# Patient Record
Sex: FEMALE | Race: WHITE
[De-identification: names, ages, dates, MRNs, and addresses within clinical notes are randomized per-mention and may not be internally consistent; named-entity substitution may affect disease eponyms.]

---

## 2020-01-17 ENCOUNTER — HOSPITAL ENCOUNTER (OUTPATIENT)
Age: 45
End: 2020-01-17
Payer: COMMERCIAL

## 2020-01-17 DIAGNOSIS — E66.9: ICD-10-CM

## 2020-01-17 DIAGNOSIS — E55.9: ICD-10-CM

## 2020-01-17 DIAGNOSIS — R30.0: ICD-10-CM

## 2020-01-17 DIAGNOSIS — R10.9: Primary | ICD-10-CM

## 2020-01-17 LAB
ALBUMIN LEVEL: 3.8 GM/DL (ref 3.4–5)
ALBUMIN/GLOB SERPL: 1.1 {RATIO} (ref 1.1–1.8)
ALP ISO SERPL-ACNC: 92 U/L (ref 46–116)
ALT SERPLBLD-CCNC: 24 U/L (ref 12–78)
AMYLASE SERPL-CCNC: 42 U/L (ref 25–115)
ANION GAP SERPL CALC-SCNC: 16 MEQ/L (ref 5–15)
AST SERPL QL: 11 U/L (ref 15–37)
BILIRUBIN,TOTAL: 0.2 MG/DL (ref 0.2–1)
BUN SERPL-MCNC: 17 MG/DL (ref 7–18)
CALCIUM SPEC-MCNC: 8.9 MG/DL (ref 8.5–10.1)
CHLORIDE SPEC-SCNC: 104 MMOL/L (ref 98–107)
CHOLEST SPEC-SCNC: 148 MG/DL (ref 140–200)
CO2 SERPL-SCNC: 24 MMOL/L (ref 21–32)
CREAT BLD-SCNC: 0.88 MG/DL (ref 0.55–1.02)
ESTIMATED GLOMERULAR FILT RATE: 70 ML/MIN (ref 60–?)
GFR (AFRICAN AMERICAN): 85 ML/MIN (ref 60–?)
GLOBULIN SER CALC-MCNC: 3.5 GM/DL (ref 1.3–3.2)
GLUCOSE: 106 MG/DL (ref 74–106)
HCT VFR BLD CALC: 43.6 % (ref 37–47)
HDLC SERPL-MCNC: 55 MG/DL (ref 29–89)
HGB BLD-MCNC: 13.7 G/DL (ref 12.2–16.2)
LIPASE: 124 U/L (ref 73–393)
MCHC RBC-ENTMCNC: 31.4 G/DL (ref 31.8–35.4)
MCV RBC: 86.4 FL (ref 81–99)
MEAN CORPUSCULAR HEMOGLOBIN: 27.1 PG (ref 27–31.2)
PLATELET # BLD: 369 K/MM3 (ref 142–424)
POTASSIUM: 4 MMOL/L (ref 3.5–5.1)
PROT SERPL-MCNC: 7.3 GM/DL (ref 6.4–8.2)
RBC # BLD AUTO: 5.04 M/MM3 (ref 4.2–5.4)
SODIUM SPEC-SCNC: 140 MMOL/L (ref 136–145)
T4 (THYROXINE): 8.9 UG/DL (ref 4.7–13.3)
TRIGLYCERIDES: 93 MG/DL (ref 30–200)
TSH SERPL-ACNC: 1.2 UIU/ML (ref 0.36–3.74)
WBC # BLD AUTO: 7.2 K/MM3 (ref 4.8–10.8)

## 2020-01-17 PROCEDURE — 83690 ASSAY OF LIPASE: CPT

## 2020-01-17 PROCEDURE — 82652 VIT D 1 25-DIHYDROXY: CPT

## 2020-01-17 PROCEDURE — 87186 SC STD MICRODIL/AGAR DIL: CPT

## 2020-01-17 PROCEDURE — 84436 ASSAY OF TOTAL THYROXINE: CPT

## 2020-01-17 PROCEDURE — 84443 ASSAY THYROID STIM HORMONE: CPT

## 2020-01-17 PROCEDURE — 80061 LIPID PANEL: CPT

## 2020-01-17 PROCEDURE — 87086 URINE CULTURE/COLONY COUNT: CPT

## 2020-01-17 PROCEDURE — 80053 COMPREHEN METABOLIC PANEL: CPT

## 2020-01-17 PROCEDURE — 85025 COMPLETE CBC W/AUTO DIFF WBC: CPT

## 2020-01-17 PROCEDURE — 82150 ASSAY OF AMYLASE: CPT

## 2020-01-17 PROCEDURE — 87088 URINE BACTERIA CULTURE: CPT

## 2020-01-27 ENCOUNTER — HOSPITAL ENCOUNTER (OUTPATIENT)
Age: 45
End: 2020-01-27
Payer: COMMERCIAL

## 2020-01-27 DIAGNOSIS — R10.9: Primary | ICD-10-CM

## 2020-01-27 PROCEDURE — 76705 ECHO EXAM OF ABDOMEN: CPT

## 2020-02-05 ENCOUNTER — HOSPITAL ENCOUNTER (OUTPATIENT)
Age: 45
End: 2020-02-05
Payer: COMMERCIAL

## 2020-02-05 DIAGNOSIS — Z12.39: Primary | ICD-10-CM

## 2020-02-05 PROCEDURE — 77067 SCR MAMMO BI INCL CAD: CPT

## 2020-02-05 PROCEDURE — 77063 BREAST TOMOSYNTHESIS BI: CPT

## 2020-03-06 ENCOUNTER — HOSPITAL ENCOUNTER (OUTPATIENT)
Age: 45
End: 2020-03-06
Payer: COMMERCIAL

## 2020-03-06 DIAGNOSIS — K76.89: Primary | ICD-10-CM

## 2020-03-06 PROCEDURE — 74170 CT ABD WO CNTRST FLWD CNTRST: CPT

## 2020-06-17 ENCOUNTER — HOSPITAL ENCOUNTER (OUTPATIENT)
Age: 45
End: 2020-06-17
Payer: SELF-PAY

## 2020-06-17 DIAGNOSIS — E66.9: Primary | ICD-10-CM

## 2020-06-17 PROCEDURE — 80305 DRUG TEST PRSMV DIR OPT OBS: CPT

## 2020-10-29 ENCOUNTER — TELEHEALTH PROVIDED OTHER THAN IN PATIENT'S HOME (OUTPATIENT)
Dept: URBAN - METROPOLITAN AREA TELEHEALTH 1 | Facility: TELEHEALTH | Age: 45
End: 2020-10-29

## 2020-10-29 DIAGNOSIS — R19.7 DIARRHEA, UNSPECIFIED: ICD-10-CM

## 2020-10-29 DIAGNOSIS — R93.2 ABNORMAL FINDINGS ON DIAGNOSTIC IMAGING OF LIVER AND BILIARY: ICD-10-CM

## 2020-10-29 DIAGNOSIS — R12 HEARTBURN: ICD-10-CM

## 2020-10-29 DIAGNOSIS — R14.0 ABDOMINAL DISTENSION (GASEOUS): ICD-10-CM

## 2020-10-29 DIAGNOSIS — R11.2 NAUSEA WITH VOMITING, UNSPECIFIED: ICD-10-CM

## 2020-10-29 DIAGNOSIS — R53.83 OTHER FATIGUE: ICD-10-CM

## 2020-10-29 DIAGNOSIS — R10.12 LEFT UPPER QUADRANT PAIN: ICD-10-CM

## 2020-10-29 PROCEDURE — 99203 OFFICE O/P NEW LOW 30 MIN: CPT | Performed by: INTERNAL MEDICINE

## 2020-10-29 RX ORDER — DICYCLOMINE HYDROCHLORIDE 10 MG/1
CAPSULE ORAL
Qty: 180 | Refills: 1 | Status: ACTIVE
Start: 2020-10-29

## 2020-10-29 RX ORDER — BUSPIRONE HYDROCHLORIDE 10 MG/1
TABLET ORAL
Qty: 60 | Refills: 11 | Status: COMPLETED
Start: 2020-10-29 | End: 2021-03-08

## 2020-11-24 ENCOUNTER — HOSPITAL ENCOUNTER (OUTPATIENT)
Age: 45
End: 2020-11-24
Payer: MEDICAID

## 2020-11-24 DIAGNOSIS — R10.12: Primary | ICD-10-CM

## 2020-11-24 DIAGNOSIS — R53.83: ICD-10-CM

## 2020-11-24 DIAGNOSIS — R12: ICD-10-CM

## 2020-11-24 DIAGNOSIS — R11.2: ICD-10-CM

## 2020-11-24 DIAGNOSIS — R14.0: ICD-10-CM

## 2020-11-24 DIAGNOSIS — R93.2: ICD-10-CM

## 2020-11-24 LAB
CRP SERPL HS-MCNC: 5.5 MG/L (ref 0–4)
FERRITIN SERPL-MCNC: 7.52 NG/ML (ref 6.24–137)
HCT VFR BLD CALC: 41.2 % (ref 37–47)
HGB BLD-MCNC: 12.3 G/DL (ref 12.2–16.2)
IRON SERPL QL: 57 UG/DL (ref 37–170)
MCHC RBC-ENTMCNC: 29.9 G/DL (ref 31.8–35.4)
MCV RBC: 84.8 FL (ref 81–99)
MEAN CORPUSCULAR HEMOGLOBIN: 25.3 PG (ref 27–31.2)
PLATELET # BLD: 346 K/MM3 (ref 142–424)
RBC # BLD AUTO: 4.86 M/MM3 (ref 4.2–5.4)
TOTAL IRON BINDING CAPACITY: 439 UG/DL (ref 265–497)
VITAMIN B12: 469 PG/ML (ref 239–931)
WBC # BLD AUTO: 4.6 K/MM3 (ref 4.8–10.8)

## 2020-11-24 PROCEDURE — 86671 FUNGUS NES ANTIBODY: CPT

## 2020-11-24 PROCEDURE — 36415 COLL VENOUS BLD VENIPUNCTURE: CPT

## 2020-11-24 PROCEDURE — 86256 FLUORESCENT ANTIBODY TITER: CPT

## 2020-11-24 PROCEDURE — 82607 VITAMIN B-12: CPT

## 2020-11-24 PROCEDURE — 83540 ASSAY OF IRON: CPT

## 2020-11-24 PROCEDURE — 83550 IRON BINDING TEST: CPT

## 2020-11-24 PROCEDURE — 86140 C-REACTIVE PROTEIN: CPT

## 2020-11-24 PROCEDURE — 83516 IMMUNOASSAY NONANTIBODY: CPT

## 2020-11-24 PROCEDURE — 85025 COMPLETE CBC W/AUTO DIFF WBC: CPT

## 2020-11-24 PROCEDURE — 85651 RBC SED RATE NONAUTOMATED: CPT

## 2020-11-24 PROCEDURE — 86255 FLUORESCENT ANTIBODY SCREEN: CPT

## 2020-11-24 PROCEDURE — 82728 ASSAY OF FERRITIN: CPT

## 2020-11-25 LAB
DEAMIDATED GLIADIN ABS, IGA: 6 UNITS (ref 0–19)
DEAMIDATED GLIADIN ABS, IGG: 3 UNITS (ref 0–19)

## 2020-11-27 LAB
SACCHAROMYCES CEREVISIAE, IGA: <20 UNITS (ref 0–24.9)
SACCHAROMYCES CEREVISIAE, IGG: <20 UNITS (ref 0–24.9)

## 2020-11-28 ENCOUNTER — HOSPITAL ENCOUNTER (OUTPATIENT)
Age: 45
End: 2020-11-28
Payer: MEDICAID

## 2020-11-28 DIAGNOSIS — Z01.812: Primary | ICD-10-CM

## 2020-11-28 PROCEDURE — 36415 COLL VENOUS BLD VENIPUNCTURE: CPT

## 2020-11-28 PROCEDURE — 86328 IA NFCT AB SARSCOV2 COVID19: CPT

## 2020-11-30 ENCOUNTER — HOSPITAL ENCOUNTER (OUTPATIENT)
Dept: HOSPITAL 22 - OUTP | Age: 45
Discharge: HOME | End: 2020-11-30
Payer: MEDICAID

## 2020-11-30 ENCOUNTER — ON CAMPUS - OUTPATIENT (OUTPATIENT)
Dept: RURAL HOSPITAL 6 | Facility: HOSPITAL | Age: 45
End: 2020-11-30

## 2020-11-30 VITALS
RESPIRATION RATE: 16 BRPM | DIASTOLIC BLOOD PRESSURE: 73 MMHG | OXYGEN SATURATION: 99 % | SYSTOLIC BLOOD PRESSURE: 107 MMHG | HEART RATE: 81 BPM

## 2020-11-30 VITALS
OXYGEN SATURATION: 92 % | SYSTOLIC BLOOD PRESSURE: 88 MMHG | DIASTOLIC BLOOD PRESSURE: 63 MMHG | RESPIRATION RATE: 16 BRPM | TEMPERATURE: 97.4 F | HEART RATE: 89 BPM

## 2020-11-30 VITALS
OXYGEN SATURATION: 97 % | SYSTOLIC BLOOD PRESSURE: 116 MMHG | TEMPERATURE: 96.98 F | RESPIRATION RATE: 18 BRPM | HEART RATE: 100 BPM | DIASTOLIC BLOOD PRESSURE: 77 MMHG

## 2020-11-30 VITALS
HEART RATE: 83 BPM | OXYGEN SATURATION: 99 % | DIASTOLIC BLOOD PRESSURE: 78 MMHG | SYSTOLIC BLOOD PRESSURE: 109 MMHG | RESPIRATION RATE: 16 BRPM

## 2020-11-30 VITALS
SYSTOLIC BLOOD PRESSURE: 104 MMHG | OXYGEN SATURATION: 98 % | DIASTOLIC BLOOD PRESSURE: 72 MMHG | RESPIRATION RATE: 16 BRPM | HEART RATE: 92 BPM

## 2020-11-30 VITALS
HEART RATE: 77 BPM | OXYGEN SATURATION: 100 % | RESPIRATION RATE: 16 BRPM | DIASTOLIC BLOOD PRESSURE: 78 MMHG | SYSTOLIC BLOOD PRESSURE: 111 MMHG

## 2020-11-30 VITALS — OXYGEN SATURATION: 100 %

## 2020-11-30 VITALS — BODY MASS INDEX: 37.2 KG/M2

## 2020-11-30 DIAGNOSIS — R11.2 NAUSEA WITH VOMITING, UNSPECIFIED: ICD-10-CM

## 2020-11-30 DIAGNOSIS — R12 HEARTBURN: ICD-10-CM

## 2020-11-30 DIAGNOSIS — K29.50 UNSPECIFIED CHRONIC GASTRITIS WITHOUT BLEEDING: ICD-10-CM

## 2020-11-30 DIAGNOSIS — K64.0: ICD-10-CM

## 2020-11-30 DIAGNOSIS — I85.00 ESOPHAGEAL VARICES WITHOUT BLEEDING: ICD-10-CM

## 2020-11-30 DIAGNOSIS — K57.90 DIVERTICULOSIS OF INTESTINE, PART UNSPECIFIED, WITHOUT PERFO: ICD-10-CM

## 2020-11-30 DIAGNOSIS — K44.9: ICD-10-CM

## 2020-11-30 DIAGNOSIS — K21.00: ICD-10-CM

## 2020-11-30 DIAGNOSIS — K30 FUNCTIONAL DYSPEPSIA: ICD-10-CM

## 2020-11-30 DIAGNOSIS — D12.5 BENIGN NEOPLASM OF SIGMOID COLON: ICD-10-CM

## 2020-11-30 DIAGNOSIS — I85.00: ICD-10-CM

## 2020-11-30 DIAGNOSIS — K52.9 NONINFECTIVE GASTROENTERITIS AND COLITIS, UNSPECIFIED: ICD-10-CM

## 2020-11-30 DIAGNOSIS — D12.4 BENIGN NEOPLASM OF DESCENDING COLON: ICD-10-CM

## 2020-11-30 DIAGNOSIS — K63.5: Primary | ICD-10-CM

## 2020-11-30 DIAGNOSIS — K31.89: ICD-10-CM

## 2020-11-30 DIAGNOSIS — K21.00 GASTRO-ESOPHAGEAL REFLUX DISEASE WITH ESOPHAGITIS, WITHOUT B: ICD-10-CM

## 2020-11-30 DIAGNOSIS — R10.12 LEFT UPPER QUADRANT PAIN: ICD-10-CM

## 2020-11-30 DIAGNOSIS — K57.30: ICD-10-CM

## 2020-11-30 LAB
AMMONIA: < 9 UMOL/L (ref 9–30)
AMYLASE SERPL-CCNC: 38 U/L (ref 30–110)
CRP SERPL HS-MCNC: 4.5 MG/L (ref 0–4)
FERRITIN SERPL-MCNC: 9.15 NG/ML (ref 6.24–137)
INR PPP: 1.05 (ref 0.9–1.1)
IRON SERPL QL: 55 UG/DL (ref 37–170)
LIPASE: 68 U/L (ref 23–300)
PT BLD: 11.6 SECONDS (ref 9.4–11.8)
TOTAL IRON BINDING CAPACITY: 426 UG/DL (ref 265–497)

## 2020-11-30 PROCEDURE — 83690 ASSAY OF LIPASE: CPT

## 2020-11-30 PROCEDURE — 82103 ALPHA-1-ANTITRYPSIN TOTAL: CPT

## 2020-11-30 PROCEDURE — 0DJ08ZZ INSPECTION OF UPPER INTESTINAL TRACT, VIA NATURAL OR ARTIFICIAL OPENING ENDOSCOPIC: ICD-10-PCS | Performed by: INTERNAL MEDICINE

## 2020-11-30 PROCEDURE — 82784 ASSAY IGA/IGD/IGG/IGM EACH: CPT

## 2020-11-30 PROCEDURE — 81025 URINE PREGNANCY TEST: CPT

## 2020-11-30 PROCEDURE — 82104 ALPHA-1-ANTITRYPSIN PHENO: CPT

## 2020-11-30 PROCEDURE — 43239 EGD BIOPSY SINGLE/MULTIPLE: CPT

## 2020-11-30 PROCEDURE — 86255 FLUORESCENT ANTIBODY SCREEN: CPT

## 2020-11-30 PROCEDURE — 86235 NUCLEAR ANTIGEN ANTIBODY: CPT

## 2020-11-30 PROCEDURE — 86140 C-REACTIVE PROTEIN: CPT

## 2020-11-30 PROCEDURE — 82105 ALPHA-FETOPROTEIN SERUM: CPT

## 2020-11-30 PROCEDURE — 82150 ASSAY OF AMYLASE: CPT

## 2020-11-30 PROCEDURE — 45385 COLONOSCOPY W/LESION REMOVAL: CPT | Performed by: INTERNAL MEDICINE

## 2020-11-30 PROCEDURE — 83540 ASSAY OF IRON: CPT

## 2020-11-30 PROCEDURE — 45385 COLONOSCOPY W/LESION REMOVAL: CPT

## 2020-11-30 PROCEDURE — 82140 ASSAY OF AMMONIA: CPT

## 2020-11-30 PROCEDURE — 83550 IRON BINDING TEST: CPT

## 2020-11-30 PROCEDURE — 43239 EGD BIOPSY SINGLE/MULTIPLE: CPT | Performed by: INTERNAL MEDICINE

## 2020-11-30 PROCEDURE — 36415 COLL VENOUS BLD VENIPUNCTURE: CPT

## 2020-11-30 PROCEDURE — 82164 ANGIOTENSIN I ENZYME TEST: CPT

## 2020-11-30 PROCEDURE — 85610 PROTHROMBIN TIME: CPT

## 2020-11-30 PROCEDURE — 86225 DNA ANTIBODY NATIVE: CPT

## 2020-11-30 PROCEDURE — 82728 ASSAY OF FERRITIN: CPT

## 2020-11-30 PROCEDURE — 86376 MICROSOMAL ANTIBODY EACH: CPT

## 2020-12-01 LAB
IGA SERPL-MCNC: 211 MG/DL (ref 87–352)
IGG SERPL-MCNC: 875 MG/DL (ref 586–1602)
IGM SERPL-MCNC: 292 MG/DL (ref 26–217)

## 2020-12-07 LAB
A2 MACROGLOB SERPL-MCNC: 213 MG/DL (ref 110–276)
ALT SERPL W P-5'-P-CCNC: 30 IU/L (ref 0–40)
APO A-I SERPL-MCNC: 171 MG/DL (ref 116–209)
AST SERPL W P-5'-P-CCNC: 22 IU/L (ref 0–40)
BILIRUB SERPL-MCNC: 0.3 MG/DL (ref 0–1.2)
CHOLEST SERPL-MCNC: 137 MG/DL (ref 100–199)
GGT SERPL-CCNC: 18 IU/L (ref 0–60)
GLUCOSE SERPL-MCNC: 82 MG/DL (ref 65–99)
HAPTOGLOB SERPL-MCNC: 210 MG/DL (ref 42–296)
LIVER FIBR SCORE SERPL CALC.FIBROSURE: (no result)
LIVER STEATOSIS GRADE SERPL QL: (no result)
LIVER STEATOSIS SCORE SERPL: (no result)
NASH GRADE SERPL QL: (no result)
NASH SCORE SERPL: (no result)
TRIGL SERPL-MCNC: 53 MG/DL (ref 0–149)

## 2021-03-08 ENCOUNTER — OFFICE (OUTPATIENT)
Dept: RURAL CLINIC 2 | Facility: CLINIC | Age: 46
End: 2021-03-08

## 2021-03-08 VITALS
TEMPERATURE: 96.9 F | DIASTOLIC BLOOD PRESSURE: 72 MMHG | WEIGHT: 190 LBS | HEIGHT: 61 IN | SYSTOLIC BLOOD PRESSURE: 112 MMHG

## 2021-03-08 DIAGNOSIS — R14.0 ABDOMINAL DISTENSION (GASEOUS): ICD-10-CM

## 2021-03-08 DIAGNOSIS — R53.83 OTHER FATIGUE: ICD-10-CM

## 2021-03-08 DIAGNOSIS — R10.12 LEFT UPPER QUADRANT PAIN: ICD-10-CM

## 2021-03-08 DIAGNOSIS — R93.2 ABNORMAL FINDINGS ON DIAGNOSTIC IMAGING OF LIVER AND BILIARY: ICD-10-CM

## 2021-03-08 DIAGNOSIS — E61.1 IRON DEFICIENCY: ICD-10-CM

## 2021-03-08 PROCEDURE — 99214 OFFICE O/P EST MOD 30 MIN: CPT | Performed by: NURSE PRACTITIONER

## 2021-03-08 RX ORDER — OMEPRAZOLE 20 MG/1
20 CAPSULE, DELAYED RELEASE ORAL
Qty: 90 | Refills: 3 | Status: ACTIVE

## 2021-03-11 ENCOUNTER — HOSPITAL ENCOUNTER (OUTPATIENT)
Age: 46
End: 2021-03-11
Payer: COMMERCIAL

## 2021-03-11 DIAGNOSIS — R14.0: ICD-10-CM

## 2021-03-11 DIAGNOSIS — R10.12: Primary | ICD-10-CM

## 2021-03-11 DIAGNOSIS — R93.2: ICD-10-CM

## 2021-03-11 DIAGNOSIS — R53.83: ICD-10-CM

## 2021-03-11 LAB
ALBUMIN LEVEL: 3.7 G/DL (ref 3.5–5)
ALBUMIN/GLOB SERPL: 1.2 {RATIO} (ref 1.1–1.8)
ALP ISO SERPL-ACNC: 89 U/L (ref 38–126)
ALT SERPLBLD-CCNC: 19 U/L (ref 12–78)
ANION GAP SERPL CALC-SCNC: 9.3 MEQ/L (ref 5–15)
AST SERPL QL: 21 U/L (ref 14–36)
BILIRUBIN,TOTAL: 0.5 MG/DL (ref 0.2–1.3)
BUN SERPL-MCNC: 12 MG/DL (ref 7–17)
CALCIUM SPEC-MCNC: 9 MG/DL (ref 8.4–10.2)
CHLORIDE SPEC-SCNC: 109 MMOL/L (ref 98–107)
CO2 SERPL-SCNC: 24 MMOL/L (ref 22–30)
CREAT BLD-SCNC: 0.6 MG/DL (ref 0.52–1.04)
ESTIMATED GLOMERULAR FILT RATE: 109 ML/MIN (ref 60–?)
FERRITIN SERPL-MCNC: 7.94 NG/ML (ref 6.24–137)
GFR (AFRICAN AMERICAN): 131 ML/MIN (ref 60–?)
GLOBULIN SER CALC-MCNC: 3 G/DL (ref 1.3–3.2)
GLUCOSE: 93 MG/DL (ref 74–100)
HCT VFR BLD CALC: 41 % (ref 37–47)
HGB BLD-MCNC: 12.2 G/DL (ref 12.2–16.2)
IRON SERPL QL: 51 UG/DL (ref 37–170)
MCHC RBC-ENTMCNC: 29.9 G/DL (ref 31.8–35.4)
MCV RBC: 86.1 FL (ref 81–99)
MEAN CORPUSCULAR HEMOGLOBIN: 25.7 PG (ref 27–31.2)
PLATELET # BLD: 352 K/MM3 (ref 142–424)
POTASSIUM: 4.3 MMOL/L (ref 3.5–5.1)
PROT SERPL-MCNC: 6.7 G/DL (ref 6.3–8.2)
RBC # BLD AUTO: 4.76 M/MM3 (ref 4.2–5.4)
SODIUM SPEC-SCNC: 138 MMOL/L (ref 136–145)
TOTAL IRON BINDING CAPACITY: 404 UG/DL (ref 265–497)
WBC # BLD AUTO: 5 K/MM3 (ref 4.8–10.8)

## 2021-03-11 PROCEDURE — 36415 COLL VENOUS BLD VENIPUNCTURE: CPT

## 2021-03-11 PROCEDURE — 82728 ASSAY OF FERRITIN: CPT

## 2021-03-11 PROCEDURE — 80053 COMPREHEN METABOLIC PANEL: CPT

## 2021-03-11 PROCEDURE — 85025 COMPLETE CBC W/AUTO DIFF WBC: CPT

## 2021-03-11 PROCEDURE — 83540 ASSAY OF IRON: CPT

## 2021-03-11 PROCEDURE — 83550 IRON BINDING TEST: CPT

## 2021-03-15 ENCOUNTER — HOSPITAL ENCOUNTER (OUTPATIENT)
Age: 46
End: 2021-03-15
Payer: COMMERCIAL

## 2021-03-15 DIAGNOSIS — R14.0: ICD-10-CM

## 2021-03-15 DIAGNOSIS — R93.2: ICD-10-CM

## 2021-03-15 DIAGNOSIS — R53.83: ICD-10-CM

## 2021-03-15 DIAGNOSIS — R10.12: Primary | ICD-10-CM

## 2021-03-15 PROCEDURE — A9576 INJ PROHANCE MULTIPACK: HCPCS

## 2021-03-15 PROCEDURE — 74183 MRI ABD W/O CNTR FLWD CNTR: CPT

## 2021-05-14 ENCOUNTER — HOSPITAL ENCOUNTER (EMERGENCY)
Age: 46
Discharge: HOME | End: 2021-05-14
Payer: COMMERCIAL

## 2021-05-14 VITALS
RESPIRATION RATE: 20 BRPM | OXYGEN SATURATION: 96 % | DIASTOLIC BLOOD PRESSURE: 80 MMHG | TEMPERATURE: 98.5 F | SYSTOLIC BLOOD PRESSURE: 108 MMHG | HEART RATE: 111 BPM

## 2021-05-14 VITALS
SYSTOLIC BLOOD PRESSURE: 108 MMHG | TEMPERATURE: 98.42 F | DIASTOLIC BLOOD PRESSURE: 80 MMHG | HEART RATE: 111 BPM | RESPIRATION RATE: 20 BRPM | OXYGEN SATURATION: 96 %

## 2021-05-14 VITALS — BODY MASS INDEX: 38 KG/M2

## 2021-05-14 DIAGNOSIS — J06.9: Primary | ICD-10-CM

## 2021-05-14 DIAGNOSIS — Z20.822: ICD-10-CM

## 2021-05-14 DIAGNOSIS — K21.9: ICD-10-CM

## 2021-05-14 PROCEDURE — U0003 INFECTIOUS AGENT DETECTION BY NUCLEIC ACID (DNA OR RNA); SEVERE ACUTE RESPIRATORY SYNDROME CORONAVIRUS 2 (SARS-COV-2) (CORONAVIRUS DISEASE [COVID-19]), AMPLIFIED PROBE TECHNIQUE, MAKING USE OF HIGH THROUGHPUT TECHNOLOGIES AS DESCRIBED BY CMS-2020-01-R: HCPCS

## 2021-05-14 PROCEDURE — 99202 OFFICE O/P NEW SF 15 MIN: CPT

## 2021-05-14 PROCEDURE — 87880 STREP A ASSAY W/OPTIC: CPT

## 2021-05-14 PROCEDURE — G0463 HOSPITAL OUTPT CLINIC VISIT: HCPCS

## 2021-09-07 ENCOUNTER — HOSPITAL ENCOUNTER (OUTPATIENT)
Age: 46
End: 2021-09-07
Payer: COMMERCIAL

## 2021-09-07 DIAGNOSIS — R53.83: ICD-10-CM

## 2021-09-07 DIAGNOSIS — R14.0: ICD-10-CM

## 2021-09-07 DIAGNOSIS — E61.1: ICD-10-CM

## 2021-09-07 DIAGNOSIS — K76.9: Primary | ICD-10-CM

## 2021-09-07 DIAGNOSIS — R10.12: ICD-10-CM

## 2021-09-07 DIAGNOSIS — R93.2: ICD-10-CM

## 2021-09-07 LAB
ALBUMIN LEVEL: 3.9 G/DL (ref 3.5–5)
ALBUMIN/GLOB SERPL: 1.3 {RATIO} (ref 1.1–1.8)
ALP ISO SERPL-ACNC: 98 U/L (ref 38–126)
ALT SERPLBLD-CCNC: 24 U/L (ref 12–78)
ANION GAP SERPL CALC-SCNC: 14 MEQ/L (ref 5–15)
AST SERPL QL: 24 U/L (ref 14–36)
BILIRUBIN,TOTAL: 0.1 MG/DL (ref 0.2–1.3)
BUN SERPL-MCNC: 15 MG/DL (ref 7–17)
CALCIUM SPEC-MCNC: 9.4 MG/DL (ref 8.4–10.2)
CHLORIDE SPEC-SCNC: 106 MMOL/L (ref 98–107)
CO2 SERPL-SCNC: 24 MMOL/L (ref 22–30)
CREAT BLD-SCNC: 0.7 MG/DL (ref 0.52–1.04)
ESTIMATED GLOMERULAR FILT RATE: 90 ML/MIN (ref 60–?)
GFR (AFRICAN AMERICAN): 109 ML/MIN (ref 60–?)
GLOBULIN SER CALC-MCNC: 2.9 G/DL (ref 1.3–3.2)
GLUCOSE: 116 MG/DL (ref 74–100)
HCT VFR BLD CALC: 40.2 % (ref 37–47)
HGB BLD-MCNC: 12.6 G/DL (ref 12.2–16.2)
IRON SERPL QL: 53 UG/DL (ref 37–170)
MCHC RBC-ENTMCNC: 31.3 G/DL (ref 31.8–35.4)
MCV RBC: 83.6 FL (ref 81–99)
MEAN CORPUSCULAR HEMOGLOBIN: 26.2 PG (ref 27–31.2)
PLATELET # BLD: 366 K/MM3 (ref 142–424)
POTASSIUM: 4 MMOL/L (ref 3.5–5.1)
PROT SERPL-MCNC: 6.8 G/DL (ref 6.3–8.2)
RBC # BLD AUTO: 4.81 M/MM3 (ref 4.2–5.4)
SODIUM SPEC-SCNC: 140 MMOL/L (ref 136–145)
TOTAL IRON BINDING CAPACITY: 428 UG/DL (ref 265–497)
WBC # BLD AUTO: 6 K/MM3 (ref 4.8–10.8)

## 2021-09-07 PROCEDURE — 83540 ASSAY OF IRON: CPT

## 2021-09-07 PROCEDURE — 80053 COMPREHEN METABOLIC PANEL: CPT

## 2021-09-07 PROCEDURE — 36415 COLL VENOUS BLD VENIPUNCTURE: CPT

## 2021-09-07 PROCEDURE — 82105 ALPHA-FETOPROTEIN SERUM: CPT

## 2021-09-07 PROCEDURE — 85025 COMPLETE CBC W/AUTO DIFF WBC: CPT

## 2021-09-07 PROCEDURE — 83550 IRON BINDING TEST: CPT

## 2021-09-13 ENCOUNTER — OFFICE (OUTPATIENT)
Dept: RURAL CLINIC 2 | Facility: CLINIC | Age: 46
End: 2021-09-13

## 2021-09-13 VITALS
DIASTOLIC BLOOD PRESSURE: 79 MMHG | TEMPERATURE: 96.7 F | WEIGHT: 203 LBS | HEIGHT: 61 IN | SYSTOLIC BLOOD PRESSURE: 116 MMHG

## 2021-09-13 DIAGNOSIS — E66.9 OBESITY, UNSPECIFIED: ICD-10-CM

## 2021-09-13 DIAGNOSIS — R19.7 DIARRHEA, UNSPECIFIED: ICD-10-CM

## 2021-09-13 DIAGNOSIS — R15.2 FECAL URGENCY: ICD-10-CM

## 2021-09-13 DIAGNOSIS — K76.89 OTHER SPECIFIED DISEASES OF LIVER: ICD-10-CM

## 2021-09-13 PROCEDURE — 99214 OFFICE O/P EST MOD 30 MIN: CPT | Performed by: NURSE PRACTITIONER

## 2021-09-13 RX ORDER — OMEPRAZOLE 20 MG/1
20 CAPSULE, DELAYED RELEASE ORAL
Qty: 90 | Refills: 3 | Status: ACTIVE

## 2021-09-13 RX ORDER — COLESTIPOL HYDROCHLORIDE 1 G/1
1 TABLET, FILM COATED ORAL
Qty: 30 | Refills: 11 | Status: ACTIVE
Start: 2021-09-13

## 2021-09-13 RX ORDER — BUSPIRONE HYDROCHLORIDE 10 MG/1
20 TABLET ORAL
Qty: 180 | Refills: 4 | Status: ACTIVE
Start: 2021-09-13

## 2021-09-18 ENCOUNTER — HOSPITAL ENCOUNTER (EMERGENCY)
Age: 46
Discharge: HOME | End: 2021-09-18
Payer: COMMERCIAL

## 2021-09-18 VITALS
TEMPERATURE: 98.1 F | DIASTOLIC BLOOD PRESSURE: 88 MMHG | OXYGEN SATURATION: 99 % | SYSTOLIC BLOOD PRESSURE: 133 MMHG | RESPIRATION RATE: 16 BRPM | HEART RATE: 104 BPM

## 2021-09-18 VITALS
TEMPERATURE: 98.5 F | DIASTOLIC BLOOD PRESSURE: 88 MMHG | HEART RATE: 104 BPM | RESPIRATION RATE: 18 BRPM | SYSTOLIC BLOOD PRESSURE: 133 MMHG | OXYGEN SATURATION: 99 %

## 2021-09-18 VITALS — BODY MASS INDEX: 38 KG/M2

## 2021-09-18 DIAGNOSIS — K21.9: ICD-10-CM

## 2021-09-18 DIAGNOSIS — Z86.718: ICD-10-CM

## 2021-09-18 DIAGNOSIS — R60.0: Primary | ICD-10-CM

## 2021-09-18 PROCEDURE — 99202 OFFICE O/P NEW SF 15 MIN: CPT

## 2021-09-18 PROCEDURE — G0463 HOSPITAL OUTPT CLINIC VISIT: HCPCS

## 2021-09-19 ENCOUNTER — HOSPITAL ENCOUNTER (OUTPATIENT)
Dept: HOSPITAL 22 - RAD | Age: 46
End: 2021-09-19
Payer: COMMERCIAL

## 2021-09-19 DIAGNOSIS — M79.662: ICD-10-CM

## 2021-09-19 DIAGNOSIS — Z86.718: ICD-10-CM

## 2021-09-19 DIAGNOSIS — R60.0: Primary | ICD-10-CM

## 2021-09-19 PROCEDURE — 93970 EXTREMITY STUDY: CPT

## 2021-09-29 ENCOUNTER — HOSPITAL ENCOUNTER (OUTPATIENT)
Age: 46
End: 2021-09-29
Payer: COMMERCIAL

## 2021-09-29 DIAGNOSIS — R60.0: Primary | ICD-10-CM

## 2021-09-29 PROCEDURE — 84443 ASSAY THYROID STIM HORMONE: CPT

## 2021-09-29 PROCEDURE — 85025 COMPLETE CBC W/AUTO DIFF WBC: CPT

## 2021-09-29 PROCEDURE — 84436 ASSAY OF TOTAL THYROXINE: CPT

## 2021-09-29 PROCEDURE — 80053 COMPREHEN METABOLIC PANEL: CPT

## 2021-09-30 LAB
ALBUMIN LEVEL: 3.8 G/DL (ref 3.5–5)
ALBUMIN/GLOB SERPL: 1.3 {RATIO} (ref 1.1–1.8)
ALP ISO SERPL-ACNC: 95 U/L (ref 38–126)
ALT SERPLBLD-CCNC: 21 U/L (ref 12–78)
ANION GAP SERPL CALC-SCNC: 14.8 MEQ/L (ref 5–15)
AST SERPL QL: 23 U/L (ref 14–36)
BILIRUBIN,TOTAL: 0.3 MG/DL (ref 0.2–1.3)
BUN SERPL-MCNC: 12 MG/DL (ref 7–17)
CALCIUM SPEC-MCNC: 8.8 MG/DL (ref 8.4–10.2)
CHLORIDE SPEC-SCNC: 107 MMOL/L (ref 98–107)
CO2 SERPL-SCNC: 23 MMOL/L (ref 22–30)
CREAT BLD-SCNC: 0.7 MG/DL (ref 0.52–1.04)
ESTIMATED GLOMERULAR FILT RATE: 90 ML/MIN (ref 60–?)
GFR (AFRICAN AMERICAN): 109 ML/MIN (ref 60–?)
GLOBULIN SER CALC-MCNC: 2.9 G/DL (ref 1.3–3.2)
GLUCOSE: 51 MG/DL (ref 74–100)
HCT VFR BLD CALC: 40.8 % (ref 37–47)
HGB BLD-MCNC: 12.1 G/DL (ref 12.2–16.2)
MCHC RBC-ENTMCNC: 29.7 G/DL (ref 31.8–35.4)
MCV RBC: 86.4 FL (ref 81–99)
MEAN CORPUSCULAR HEMOGLOBIN: 25.7 PG (ref 27–31.2)
PLATELET # BLD: 368 K/MM3 (ref 142–424)
POTASSIUM: 4.8 MMOL/L (ref 3.5–5.1)
PROT SERPL-MCNC: 6.7 G/DL (ref 6.3–8.2)
RBC # BLD AUTO: 4.73 M/MM3 (ref 4.2–5.4)
SODIUM SPEC-SCNC: 140 MMOL/L (ref 136–145)
T4 (THYROXINE): 6.9 UG/DL (ref 5.53–11)
TSH SERPL-ACNC: 1.15 UIU/ML (ref 0.47–4.68)
WBC # BLD AUTO: 5.2 K/MM3 (ref 4.8–10.8)

## 2021-10-08 ENCOUNTER — HOSPITAL ENCOUNTER (OUTPATIENT)
Age: 46
End: 2021-10-08
Payer: COMMERCIAL

## 2021-10-08 DIAGNOSIS — R60.0: Primary | ICD-10-CM

## 2021-10-08 PROCEDURE — 93306 TTE W/DOPPLER COMPLETE: CPT

## 2021-11-01 ENCOUNTER — HOSPITAL ENCOUNTER (OUTPATIENT)
Age: 46
End: 2021-11-01
Payer: COMMERCIAL

## 2021-11-01 DIAGNOSIS — R40.0: ICD-10-CM

## 2021-11-01 DIAGNOSIS — R60.0: ICD-10-CM

## 2021-11-01 DIAGNOSIS — I10: ICD-10-CM

## 2021-11-01 DIAGNOSIS — R00.0: ICD-10-CM

## 2021-11-01 DIAGNOSIS — R06.83: ICD-10-CM

## 2021-11-01 DIAGNOSIS — R06.00: Primary | ICD-10-CM

## 2021-11-01 PROCEDURE — 95806 SLEEP STUDY UNATT&RESP EFFT: CPT

## 2021-11-09 ENCOUNTER — HOSPITAL ENCOUNTER (OUTPATIENT)
Age: 46
End: 2021-11-09
Payer: COMMERCIAL

## 2021-11-09 DIAGNOSIS — R60.0: ICD-10-CM

## 2021-11-09 DIAGNOSIS — R06.00: Primary | ICD-10-CM

## 2021-11-09 DIAGNOSIS — I10: ICD-10-CM

## 2021-11-09 DIAGNOSIS — R40.0: ICD-10-CM

## 2021-11-09 DIAGNOSIS — R06.83: ICD-10-CM

## 2021-11-09 DIAGNOSIS — R00.0: ICD-10-CM

## 2021-11-09 DIAGNOSIS — Z86.718: ICD-10-CM

## 2021-11-09 LAB
ANION GAP SERPL CALC-SCNC: 13.3 MEQ/L (ref 5–15)
BUN SERPL-MCNC: 15 MG/DL (ref 7–17)
CALCIUM SPEC-MCNC: 9.2 MG/DL (ref 8.4–10.2)
CHLORIDE SPEC-SCNC: 109 MMOL/L (ref 98–107)
CO2 SERPL-SCNC: 24 MMOL/L (ref 22–30)
CREAT BLD-SCNC: 0.6 MG/DL (ref 0.52–1.04)
ESTIMATED GLOMERULAR FILT RATE: 108 ML/MIN (ref 60–?)
GFR (AFRICAN AMERICAN): 131 ML/MIN (ref 60–?)
GLUCOSE: 120 MG/DL (ref 74–100)
POTASSIUM: 4.3 MMOL/L (ref 3.5–5.1)
SODIUM SPEC-SCNC: 142 MMOL/L (ref 136–145)

## 2021-11-09 PROCEDURE — 80048 BASIC METABOLIC PNL TOTAL CA: CPT

## 2021-11-09 PROCEDURE — 36415 COLL VENOUS BLD VENIPUNCTURE: CPT

## 2021-11-12 ENCOUNTER — HOSPITAL ENCOUNTER (OUTPATIENT)
Age: 46
End: 2021-11-12
Payer: COMMERCIAL

## 2021-11-12 DIAGNOSIS — I10: ICD-10-CM

## 2021-11-12 DIAGNOSIS — R07.9: Primary | ICD-10-CM

## 2021-11-12 DIAGNOSIS — Z86.718: ICD-10-CM

## 2021-11-12 PROCEDURE — 93017 CV STRESS TEST TRACING ONLY: CPT

## 2022-08-25 ENCOUNTER — HOSPITAL ENCOUNTER (EMERGENCY)
Age: 47
Discharge: HOME | End: 2022-08-25
Payer: COMMERCIAL

## 2022-08-25 VITALS
DIASTOLIC BLOOD PRESSURE: 82 MMHG | TEMPERATURE: 97.88 F | OXYGEN SATURATION: 97 % | HEART RATE: 82 BPM | SYSTOLIC BLOOD PRESSURE: 114 MMHG | RESPIRATION RATE: 20 BRPM

## 2022-08-25 VITALS
HEART RATE: 82 BPM | TEMPERATURE: 97.9 F | DIASTOLIC BLOOD PRESSURE: 82 MMHG | SYSTOLIC BLOOD PRESSURE: 114 MMHG | OXYGEN SATURATION: 97 % | RESPIRATION RATE: 20 BRPM

## 2022-08-25 VITALS — BODY MASS INDEX: 38.7 KG/M2

## 2022-08-25 DIAGNOSIS — N39.0: Primary | ICD-10-CM

## 2022-08-25 LAB
PH,URINE: 7 (ref 5–8.5)
SPECIFIC GRAVITY, URINE: 1.02 (ref 1–1.03)
UROBILINOGEN,URINE: 0.2 EU/DL

## 2022-08-25 PROCEDURE — G0463 HOSPITAL OUTPT CLINIC VISIT: HCPCS

## 2022-08-25 PROCEDURE — 99212 OFFICE O/P EST SF 10 MIN: CPT

## 2022-08-25 PROCEDURE — 81003 URINALYSIS AUTO W/O SCOPE: CPT

## 2022-08-31 ENCOUNTER — OFFICE (OUTPATIENT)
Dept: URBAN - METROPOLITAN AREA CLINIC 4 | Facility: CLINIC | Age: 47
End: 2022-08-31

## 2022-08-31 VITALS — WEIGHT: 207 LBS | SYSTOLIC BLOOD PRESSURE: 112 MMHG | HEIGHT: 61 IN | DIASTOLIC BLOOD PRESSURE: 86 MMHG

## 2022-08-31 DIAGNOSIS — R15.2 FECAL URGENCY: ICD-10-CM

## 2022-08-31 DIAGNOSIS — R14.0 ABDOMINAL DISTENSION (GASEOUS): ICD-10-CM

## 2022-08-31 DIAGNOSIS — K30 FUNCTIONAL DYSPEPSIA: ICD-10-CM

## 2022-08-31 DIAGNOSIS — R19.7 DIARRHEA, UNSPECIFIED: ICD-10-CM

## 2022-08-31 DIAGNOSIS — E66.9 OBESITY, UNSPECIFIED: ICD-10-CM

## 2022-08-31 PROCEDURE — 99214 OFFICE O/P EST MOD 30 MIN: CPT | Performed by: NURSE PRACTITIONER

## 2022-08-31 RX ORDER — OMEPRAZOLE 20 MG/1
20 CAPSULE, DELAYED RELEASE ORAL
Qty: 90 | Refills: 1 | Status: ACTIVE
Start: 2022-08-31

## 2022-08-31 RX ORDER — DICYCLOMINE HYDROCHLORIDE 10 MG/1
CAPSULE ORAL
Qty: 180 | Refills: 1 | Status: ACTIVE
Start: 2020-10-29

## 2022-08-31 RX ORDER — BUSPIRONE HYDROCHLORIDE 10 MG/1
TABLET ORAL
Qty: 90 | Refills: 3 | Status: ACTIVE
Start: 2022-08-31

## 2022-10-25 ENCOUNTER — HOSPITAL ENCOUNTER (OUTPATIENT)
Age: 47
End: 2022-10-25
Payer: COMMERCIAL

## 2022-10-25 DIAGNOSIS — R53.83: ICD-10-CM

## 2022-10-25 DIAGNOSIS — R35.0: ICD-10-CM

## 2022-10-25 DIAGNOSIS — M54.50: Primary | ICD-10-CM

## 2022-10-25 DIAGNOSIS — E55.9: ICD-10-CM

## 2022-10-25 LAB
25-OH VITAMIN D, TOTAL: 18.5 NG/ML (ref 30–100)
ALBUMIN LEVEL: 4 G/DL (ref 3.5–5)
ALBUMIN/GLOB SERPL: 1.3 {RATIO} (ref 1.1–1.8)
ALP ISO SERPL-ACNC: 134 U/L (ref 38–126)
ALT SERPLBLD-CCNC: 32 U/L (ref 12–78)
ANION GAP SERPL CALC-SCNC: 11.6 MEQ/L (ref 5–15)
AST SERPL QL: 31 U/L (ref 14–36)
BILIRUBIN,TOTAL: 0.5 MG/DL (ref 0.2–1.3)
BUN SERPL-MCNC: 12 MG/DL (ref 7–17)
CALCIUM SPEC-MCNC: 8.7 MG/DL (ref 8.4–10.2)
CHLORIDE SPEC-SCNC: 107 MMOL/L (ref 98–107)
CHOLEST SPEC-SCNC: 165 MG/DL (ref 140–200)
CO2 SERPL-SCNC: 23 MMOL/L (ref 22–30)
CREAT BLD-SCNC: 0.6 MG/DL (ref 0.52–1.04)
ESTIMATED GLOMERULAR FILT RATE: 108 ML/MIN (ref 60–?)
GFR (AFRICAN AMERICAN): 130 ML/MIN (ref 60–?)
GLOBULIN SER CALC-MCNC: 3 G/DL (ref 1.3–3.2)
GLUCOSE: 84 MG/DL (ref 74–100)
HCT VFR BLD CALC: 42.5 % (ref 37–47)
HDLC SERPL-MCNC: 60 MG/DL (ref 40–60)
HGB BLD-MCNC: 13.3 G/DL (ref 12.2–16.2)
MCHC RBC-ENTMCNC: 31.2 G/DL (ref 31.8–35.4)
MCV RBC: 83.5 FL (ref 81–99)
MEAN CORPUSCULAR HEMOGLOBIN: 26 PG (ref 27–31.2)
PLATELET # BLD: 439 K/MM3 (ref 142–424)
POTASSIUM: 4.6 MMOL/L (ref 3.5–5.1)
PROT SERPL-MCNC: 7 G/DL (ref 6.3–8.2)
RBC # BLD AUTO: 5.1 M/MM3 (ref 4.2–5.4)
SODIUM SPEC-SCNC: 137 MMOL/L (ref 136–145)
TRIGLYCERIDES: 92 MG/DL (ref 30–150)
TSH SERPL-ACNC: 1.64 UIU/ML (ref 0.47–4.68)
WBC # BLD AUTO: 5.4 K/MM3 (ref 4.8–10.8)

## 2022-10-25 PROCEDURE — 82306 VITAMIN D 25 HYDROXY: CPT

## 2022-10-25 PROCEDURE — 85025 COMPLETE CBC W/AUTO DIFF WBC: CPT

## 2022-10-25 PROCEDURE — 84443 ASSAY THYROID STIM HORMONE: CPT

## 2022-10-25 PROCEDURE — 80053 COMPREHEN METABOLIC PANEL: CPT

## 2022-10-25 PROCEDURE — 87086 URINE CULTURE/COLONY COUNT: CPT

## 2022-10-25 PROCEDURE — 80061 LIPID PANEL: CPT

## 2022-11-17 ENCOUNTER — HOSPITAL ENCOUNTER (OUTPATIENT)
Age: 47
End: 2022-11-17
Payer: COMMERCIAL

## 2022-11-17 DIAGNOSIS — Z12.31: Primary | ICD-10-CM

## 2022-11-17 DIAGNOSIS — R10.2: Primary | ICD-10-CM

## 2022-11-17 DIAGNOSIS — B96.29: ICD-10-CM

## 2022-11-17 DIAGNOSIS — R35.0: ICD-10-CM

## 2022-11-17 LAB — HBA1C MFR BLD: 5.5 % (ref 4–6)

## 2022-11-17 PROCEDURE — 83036 HEMOGLOBIN GLYCOSYLATED A1C: CPT

## 2022-11-17 PROCEDURE — 36415 COLL VENOUS BLD VENIPUNCTURE: CPT

## 2022-11-17 PROCEDURE — 77067 SCR MAMMO BI INCL CAD: CPT

## 2022-11-17 PROCEDURE — 77063 BREAST TOMOSYNTHESIS BI: CPT

## 2022-11-29 ENCOUNTER — HOSPITAL ENCOUNTER (OUTPATIENT)
Dept: HOSPITAL 22 - PT | Age: 47
Discharge: HOME | End: 2022-11-29
Payer: COMMERCIAL

## 2022-11-29 DIAGNOSIS — M54.50: Primary | ICD-10-CM

## 2022-11-29 PROCEDURE — 97110 THERAPEUTIC EXERCISES: CPT

## 2022-11-29 PROCEDURE — 97014 ELECTRIC STIMULATION THERAPY: CPT

## 2022-11-29 PROCEDURE — 97163 PT EVAL HIGH COMPLEX 45 MIN: CPT

## 2022-11-29 PROCEDURE — 97010 HOT OR COLD PACKS THERAPY: CPT

## 2022-11-29 PROCEDURE — G0283 ELEC STIM OTHER THAN WOUND: HCPCS

## 2023-01-02 ENCOUNTER — HOSPITAL ENCOUNTER (OUTPATIENT)
Dept: HOSPITAL 22 - RAD | Age: 48
End: 2023-01-02
Payer: COMMERCIAL

## 2023-01-02 DIAGNOSIS — R10.2: Primary | ICD-10-CM

## 2023-01-02 PROCEDURE — 76830 TRANSVAGINAL US NON-OB: CPT

## 2023-06-07 ENCOUNTER — HOSPITAL ENCOUNTER (OUTPATIENT)
Age: 48
End: 2023-06-07
Payer: COMMERCIAL

## 2023-06-07 DIAGNOSIS — M25.572: Primary | ICD-10-CM

## 2023-06-07 PROCEDURE — 73610 X-RAY EXAM OF ANKLE: CPT

## 2023-06-19 ENCOUNTER — HOSPITAL ENCOUNTER (OUTPATIENT)
Age: 48
End: 2023-06-19
Payer: COMMERCIAL

## 2023-06-19 DIAGNOSIS — R74.8: ICD-10-CM

## 2023-06-19 DIAGNOSIS — I10: ICD-10-CM

## 2023-06-19 DIAGNOSIS — R10.13: Primary | ICD-10-CM

## 2023-06-19 DIAGNOSIS — Z86.39: ICD-10-CM

## 2023-06-19 DIAGNOSIS — Z79.899: ICD-10-CM

## 2023-06-19 DIAGNOSIS — E55.9: ICD-10-CM

## 2023-06-19 LAB
25-OH VITAMIN D, TOTAL: 29.3 NG/ML (ref 30–100)
ALBUMIN LEVEL: 3.9 G/DL (ref 3.5–5)
ALBUMIN/GLOB SERPL: 1.3 {RATIO} (ref 1.1–1.8)
ALP ISO SERPL-ACNC: 112 U/L (ref 38–126)
ALT SERPLBLD-CCNC: 23 U/L (ref 12–78)
AMYLASE SERPL-CCNC: 55 U/L (ref 30–110)
ANION GAP SERPL CALC-SCNC: 13.4 MEQ/L (ref 5–15)
AST SERPL QL: 26 U/L (ref 14–36)
BILIRUBIN,TOTAL: 0.3 MG/DL (ref 0.2–1.3)
BUN SERPL-MCNC: 14 MG/DL (ref 7–17)
CALCIUM SPEC-MCNC: 8.9 MG/DL (ref 8.4–10.2)
CHLORIDE SPEC-SCNC: 104 MMOL/L (ref 98–107)
CHOLEST SPEC-SCNC: 139 MG/DL (ref 140–200)
CO2 SERPL-SCNC: 26 MMOL/L (ref 22–30)
CREAT BLD-SCNC: 0.5 MG/DL (ref 0.52–1.04)
ESTIMATED GLOMERULAR FILT RATE: 132 ML/MIN (ref 60–?)
FERRITIN SERPL-MCNC: 10.2 NG/ML (ref 6.24–137)
GAMMA GLUTAMYL TRANSPEPTIDASE: 21 U/L (ref 12–43)
GFR (AFRICAN AMERICAN): 160 ML/MIN (ref 60–?)
GLOBULIN SER CALC-MCNC: 2.9 G/DL (ref 1.3–3.2)
GLUCOSE: 84 MG/DL (ref 74–100)
HCT VFR BLD CALC: 39.1 % (ref 37–47)
HDLC SERPL-MCNC: 64 MG/DL (ref 40–60)
HGB BLD-MCNC: 12.4 G/DL (ref 12.2–16.2)
IRON SERPL QL: 37 UG/DL (ref 37–170)
LIPASE: 58 U/L (ref 23–300)
MCHC RBC-ENTMCNC: 31.7 G/DL (ref 31.8–35.4)
MCV RBC: 81.1 FL (ref 81–99)
MEAN CORPUSCULAR HEMOGLOBIN: 25.7 PG (ref 27–31.2)
PLATELET # BLD: 393 K/MM3 (ref 142–424)
POTASSIUM: 4.4 MMOL/L (ref 3.5–5.1)
PROT SERPL-MCNC: 6.8 G/DL (ref 6.3–8.2)
RBC # BLD AUTO: 4.81 M/MM3 (ref 4.2–5.4)
SODIUM SPEC-SCNC: 139 MMOL/L (ref 136–145)
TOTAL IRON BINDING CAPACITY: 387 UG/DL (ref 265–497)
TRIGLYCERIDES: 59 MG/DL (ref 30–150)
TSH SERPL-ACNC: 1.55 UIU/ML (ref 0.47–4.68)
WBC # BLD AUTO: 6.8 K/MM3 (ref 4.8–10.8)

## 2023-06-19 PROCEDURE — 82306 VITAMIN D 25 HYDROXY: CPT

## 2023-06-19 PROCEDURE — 83540 ASSAY OF IRON: CPT

## 2023-06-19 PROCEDURE — 36415 COLL VENOUS BLD VENIPUNCTURE: CPT

## 2023-06-19 PROCEDURE — 83550 IRON BINDING TEST: CPT

## 2023-06-19 PROCEDURE — 85025 COMPLETE CBC W/AUTO DIFF WBC: CPT

## 2023-06-19 PROCEDURE — 80053 COMPREHEN METABOLIC PANEL: CPT

## 2023-06-19 PROCEDURE — 82150 ASSAY OF AMYLASE: CPT

## 2023-06-19 PROCEDURE — 82728 ASSAY OF FERRITIN: CPT

## 2023-06-19 PROCEDURE — 83690 ASSAY OF LIPASE: CPT

## 2023-06-19 PROCEDURE — 80061 LIPID PANEL: CPT

## 2023-06-19 PROCEDURE — 84443 ASSAY THYROID STIM HORMONE: CPT

## 2023-06-19 PROCEDURE — 82977 ASSAY OF GGT: CPT

## 2023-06-26 ENCOUNTER — HOSPITAL ENCOUNTER (OUTPATIENT)
Dept: HOSPITAL 22 - RAD | Age: 48
End: 2023-06-26
Payer: COMMERCIAL

## 2023-06-26 DIAGNOSIS — R10.13: Primary | ICD-10-CM

## 2023-06-26 DIAGNOSIS — R10.12: ICD-10-CM

## 2023-06-26 DIAGNOSIS — K76.89: ICD-10-CM

## 2023-06-26 PROCEDURE — A9576 INJ PROHANCE MULTIPACK: HCPCS

## 2023-06-26 PROCEDURE — 74183 MRI ABD W/O CNTR FLWD CNTR: CPT

## 2023-07-17 ENCOUNTER — HOSPITAL ENCOUNTER (OUTPATIENT)
Age: 48
End: 2023-07-17
Payer: COMMERCIAL

## 2023-07-17 DIAGNOSIS — M79.672: Primary | ICD-10-CM

## 2023-07-17 DIAGNOSIS — M25.572: ICD-10-CM

## 2023-07-17 PROCEDURE — 73610 X-RAY EXAM OF ANKLE: CPT

## 2023-07-17 PROCEDURE — 73630 X-RAY EXAM OF FOOT: CPT

## 2023-09-07 ENCOUNTER — HOSPITAL ENCOUNTER (OUTPATIENT)
Dept: HOSPITAL 22 - PT | Age: 48
Discharge: HOME | End: 2023-09-07
Payer: COMMERCIAL

## 2023-09-07 DIAGNOSIS — S93.402A: ICD-10-CM

## 2023-09-07 DIAGNOSIS — S93.602A: ICD-10-CM

## 2023-09-07 DIAGNOSIS — M79.672: ICD-10-CM

## 2023-09-07 DIAGNOSIS — M25.572: Primary | ICD-10-CM

## 2023-09-07 PROCEDURE — 97014 ELECTRIC STIMULATION THERAPY: CPT

## 2023-09-07 PROCEDURE — 97530 THERAPEUTIC ACTIVITIES: CPT

## 2023-09-07 PROCEDURE — 97163 PT EVAL HIGH COMPLEX 45 MIN: CPT

## 2023-09-07 PROCEDURE — 97140 MANUAL THERAPY 1/> REGIONS: CPT

## 2023-09-07 PROCEDURE — 97010 HOT OR COLD PACKS THERAPY: CPT

## 2023-09-07 PROCEDURE — 97110 THERAPEUTIC EXERCISES: CPT

## 2023-09-07 PROCEDURE — G0283 ELEC STIM OTHER THAN WOUND: HCPCS

## 2023-09-07 PROCEDURE — 97035 APP MDLTY 1+ULTRASOUND EA 15: CPT

## 2023-09-07 PROCEDURE — 97164 PT RE-EVAL EST PLAN CARE: CPT

## 2023-09-12 ENCOUNTER — HOSPITAL ENCOUNTER (OUTPATIENT)
Dept: HOSPITAL 22 - LAB.DROPOF | Age: 48
End: 2023-09-12
Payer: COMMERCIAL

## 2023-09-12 DIAGNOSIS — R30.0: ICD-10-CM

## 2023-09-12 DIAGNOSIS — E66.9: Primary | ICD-10-CM

## 2023-09-12 DIAGNOSIS — R53.83: ICD-10-CM

## 2023-09-12 LAB
25-OH VITAMIN D, TOTAL: 34.4 NG/ML (ref 30–100)
ALBUMIN LEVEL: 3.8 G/DL (ref 3.5–5)
ALBUMIN/GLOB SERPL: 1.3 {RATIO} (ref 1.1–1.8)
ALP ISO SERPL-ACNC: 113 U/L (ref 38–126)
ALT SERPLBLD-CCNC: 27 U/L (ref 12–78)
ANION GAP SERPL CALC-SCNC: 13.6 MEQ/L (ref 5–15)
AST SERPL QL: 29 U/L (ref 14–36)
BILIRUBIN,TOTAL: 0.4 MG/DL (ref 0.2–1.3)
BUN SERPL-MCNC: 13 MG/DL (ref 7–17)
CALCIUM SPEC-MCNC: 8.6 MG/DL (ref 8.4–10.2)
CHLORIDE SPEC-SCNC: 107 MMOL/L (ref 98–107)
CHOLEST SPEC-SCNC: 156 MG/DL (ref 140–200)
CO2 SERPL-SCNC: 25 MMOL/L (ref 22–30)
CREAT BLD-SCNC: 0.8 MG/DL (ref 0.52–1.04)
ESTIMATED GLOMERULAR FILT RATE: 77 ML/MIN (ref 60–?)
FERRITIN SERPL-MCNC: 9.4 NG/ML (ref 6.24–137)
GFR (AFRICAN AMERICAN): 93 ML/MIN (ref 60–?)
GLOBULIN SER CALC-MCNC: 2.9 G/DL (ref 1.3–3.2)
GLUCOSE: 101 MG/DL (ref 74–100)
HBA1C MFR BLD: 5.6 % (ref 4–6)
HCT VFR BLD CALC: 42.9 % (ref 37–47)
HDLC SERPL-MCNC: 51 MG/DL (ref 40–60)
HGB BLD-MCNC: 13.5 G/DL (ref 12.2–16.2)
IRON SERPL QL: 76 UG/DL (ref 37–170)
MCHC RBC-ENTMCNC: 31.4 G/DL (ref 31.8–35.4)
MCV RBC: 83.6 FL (ref 81–99)
MEAN CORPUSCULAR HEMOGLOBIN: 26.3 PG (ref 27–31.2)
PLATELET # BLD: 389 K/MM3 (ref 142–424)
POTASSIUM: 4.6 MMOL/L (ref 3.5–5.1)
PROT SERPL-MCNC: 6.7 G/DL (ref 6.3–8.2)
RBC # BLD AUTO: 5.12 M/MM3 (ref 4.2–5.4)
SODIUM SPEC-SCNC: 141 MMOL/L (ref 136–145)
TRIGLYCERIDES: 79 MG/DL (ref 30–150)
TSH SERPL-ACNC: 1.02 UIU/ML (ref 0.47–4.68)
WBC # BLD AUTO: 5.9 K/MM3 (ref 4.8–10.8)

## 2023-09-12 PROCEDURE — 84443 ASSAY THYROID STIM HORMONE: CPT

## 2023-09-12 PROCEDURE — 83540 ASSAY OF IRON: CPT

## 2023-09-12 PROCEDURE — 87086 URINE CULTURE/COLONY COUNT: CPT

## 2023-09-12 PROCEDURE — 83036 HEMOGLOBIN GLYCOSYLATED A1C: CPT

## 2023-09-12 PROCEDURE — 82728 ASSAY OF FERRITIN: CPT

## 2023-09-12 PROCEDURE — 82306 VITAMIN D 25 HYDROXY: CPT

## 2023-09-12 PROCEDURE — 85025 COMPLETE CBC W/AUTO DIFF WBC: CPT

## 2023-09-12 PROCEDURE — 80061 LIPID PANEL: CPT

## 2023-09-12 PROCEDURE — 80053 COMPREHEN METABOLIC PANEL: CPT

## 2023-09-19 ENCOUNTER — HOSPITAL ENCOUNTER (OUTPATIENT)
Age: 48
End: 2023-09-19
Payer: COMMERCIAL

## 2023-09-19 ENCOUNTER — HOSPITAL ENCOUNTER (EMERGENCY)
Age: 48
Discharge: HOME | End: 2023-09-19
Payer: COMMERCIAL

## 2023-09-19 VITALS
TEMPERATURE: 98.06 F | OXYGEN SATURATION: 100 % | RESPIRATION RATE: 18 BRPM | SYSTOLIC BLOOD PRESSURE: 115 MMHG | DIASTOLIC BLOOD PRESSURE: 78 MMHG | HEART RATE: 97 BPM

## 2023-09-19 VITALS
DIASTOLIC BLOOD PRESSURE: 91 MMHG | HEART RATE: 95 BPM | RESPIRATION RATE: 20 BRPM | OXYGEN SATURATION: 96 % | TEMPERATURE: 98.2 F | SYSTOLIC BLOOD PRESSURE: 144 MMHG

## 2023-09-19 VITALS — BODY MASS INDEX: 41.3 KG/M2

## 2023-09-19 DIAGNOSIS — F17.200: ICD-10-CM

## 2023-09-19 DIAGNOSIS — K21.9: ICD-10-CM

## 2023-09-19 DIAGNOSIS — R10.31: Primary | ICD-10-CM

## 2023-09-19 DIAGNOSIS — I10: ICD-10-CM

## 2023-09-19 DIAGNOSIS — G43.909: ICD-10-CM

## 2023-09-19 LAB
ALBUMIN LEVEL: 4 G/DL (ref 3.5–5)
ALBUMIN/GLOB SERPL: 1.3 {RATIO} (ref 1.1–1.8)
ALP ISO SERPL-ACNC: 101 U/L (ref 38–126)
ALT SERPLBLD-CCNC: 27 U/L (ref 12–78)
ANION GAP SERPL CALC-SCNC: 13.5 MEQ/L (ref 5–15)
AST SERPL QL: 26 U/L (ref 14–36)
BILIRUBIN,TOTAL: 0.2 MG/DL (ref 0.2–1.3)
BUN SERPL-MCNC: 17 MG/DL (ref 7–17)
CALCIUM SPEC-MCNC: 9.1 MG/DL (ref 8.4–10.2)
CHLORIDE SPEC-SCNC: 106 MMOL/L (ref 98–107)
CO2 SERPL-SCNC: 24 MMOL/L (ref 22–30)
COLOR UR: YELLOW
CREAT BLD-SCNC: 0.8 MG/DL (ref 0.52–1.04)
CREATININE CLEARANCE ESTIMATED: 62 ML/MIN (ref 50–200)
ESTIMATED GLOMERULAR FILT RATE: 77 ML/MIN (ref 60–?)
GFR (AFRICAN AMERICAN): 93 ML/MIN (ref 60–?)
GLOBULIN SER CALC-MCNC: 3.1 G/DL (ref 1.3–3.2)
GLUCOSE: 94 MG/DL (ref 74–100)
HCT VFR BLD CALC: 41.4 % (ref 37–47)
HGB BLD-MCNC: 12.8 G/DL (ref 12.2–16.2)
LIPASE: 67 U/L (ref 23–300)
MCHC RBC-ENTMCNC: 30.9 G/DL (ref 31.8–35.4)
MCV RBC: 83 FL (ref 81–99)
MEAN CORPUSCULAR HEMOGLOBIN: 25.7 PG (ref 27–31.2)
MICRO URNS: (no result)
PH UR: 6 [PH] (ref 5–8.5)
PLATELET # BLD: 370 K/MM3 (ref 142–424)
POTASSIUM: 3.5 MMOL/L (ref 3.5–5.1)
PROT SERPL-MCNC: 7.1 G/DL (ref 6.3–8.2)
RBC # BLD AUTO: 4.99 M/MM3 (ref 4.2–5.4)
SODIUM SPEC-SCNC: 140 MMOL/L (ref 136–145)
SP GR UR: >= 1.03 (ref 1–1.03)
UROBILINOGEN UR QL: 0.2 EU/DL
WBC # BLD AUTO: 6.2 K/MM3 (ref 4.8–10.8)

## 2023-09-19 PROCEDURE — 87491 CHLMYD TRACH DNA AMP PROBE: CPT

## 2023-09-19 PROCEDURE — 87591 N.GONORRHOEAE DNA AMP PROB: CPT

## 2023-09-19 PROCEDURE — 86140 C-REACTIVE PROTEIN: CPT

## 2023-09-19 PROCEDURE — 87661 TRICHOMONAS VAGINALIS AMPLIF: CPT

## 2023-09-19 PROCEDURE — 80053 COMPREHEN METABOLIC PANEL: CPT

## 2023-09-19 PROCEDURE — 83690 ASSAY OF LIPASE: CPT

## 2023-09-19 PROCEDURE — 85025 COMPLETE CBC W/AUTO DIFF WBC: CPT

## 2023-09-19 PROCEDURE — 99285 EMERGENCY DEPT VISIT HI MDM: CPT

## 2023-09-19 PROCEDURE — 74176 CT ABD & PELVIS W/O CONTRAST: CPT

## 2023-09-19 PROCEDURE — 87210 SMEAR WET MOUNT SALINE/INK: CPT

## 2023-09-19 PROCEDURE — 81001 URINALYSIS AUTO W/SCOPE: CPT

## 2023-09-21 LAB — CRP SERPL HS-MCNC: 7.4 MG/L (ref 0–4)

## 2023-09-29 ENCOUNTER — HOSPITAL ENCOUNTER (OUTPATIENT)
Age: 48
End: 2023-09-29
Payer: COMMERCIAL

## 2023-09-29 DIAGNOSIS — R10.2: Primary | ICD-10-CM

## 2023-09-29 PROCEDURE — 87086 URINE CULTURE/COLONY COUNT: CPT

## 2023-10-09 ENCOUNTER — OFFICE (OUTPATIENT)
Dept: URBAN - METROPOLITAN AREA CLINIC 4 | Facility: CLINIC | Age: 48
End: 2023-10-09

## 2023-10-09 VITALS — HEIGHT: 61 IN | WEIGHT: 211 LBS | DIASTOLIC BLOOD PRESSURE: 82 MMHG | SYSTOLIC BLOOD PRESSURE: 120 MMHG

## 2023-10-09 DIAGNOSIS — K92.1 MELENA: ICD-10-CM

## 2023-10-09 DIAGNOSIS — R19.7 DIARRHEA, UNSPECIFIED: ICD-10-CM

## 2023-10-09 DIAGNOSIS — K30 FUNCTIONAL DYSPEPSIA: ICD-10-CM

## 2023-10-09 DIAGNOSIS — R10.31 RIGHT LOWER QUADRANT PAIN: ICD-10-CM

## 2023-10-09 DIAGNOSIS — E61.1 IRON DEFICIENCY: ICD-10-CM

## 2023-10-09 DIAGNOSIS — K76.9 LIVER DISEASE, UNSPECIFIED: ICD-10-CM

## 2023-10-09 DIAGNOSIS — R15.0 INCOMPLETE DEFECATION: ICD-10-CM

## 2023-10-09 DIAGNOSIS — R14.0 ABDOMINAL DISTENSION (GASEOUS): ICD-10-CM

## 2023-10-09 PROCEDURE — 99214 OFFICE O/P EST MOD 30 MIN: CPT | Performed by: NURSE PRACTITIONER

## 2023-11-08 ENCOUNTER — HOSPITAL ENCOUNTER (OUTPATIENT)
Age: 48
End: 2023-11-08
Payer: COMMERCIAL

## 2023-11-08 DIAGNOSIS — S49.92XA: ICD-10-CM

## 2023-11-08 DIAGNOSIS — M25.512: Primary | ICD-10-CM
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
## 2023-11-08 PROCEDURE — 73030 X-RAY EXAM OF SHOULDER: CPT

## 2024-04-02 ENCOUNTER — HOSPITAL ENCOUNTER (OUTPATIENT)
Dept: HOSPITAL 22 - RAD | Age: 49
End: 2024-04-02
Payer: COMMERCIAL

## 2024-04-02 DIAGNOSIS — M25.531: Primary | ICD-10-CM

## 2024-04-02 PROCEDURE — 73110 X-RAY EXAM OF WRIST: CPT

## 2024-05-29 ENCOUNTER — HOSPITAL ENCOUNTER (OUTPATIENT)
Dept: HOSPITAL 22 - RT | Age: 49
Discharge: HOME | End: 2024-05-29
Payer: COMMERCIAL

## 2024-05-29 DIAGNOSIS — R06.09: Primary | ICD-10-CM

## 2024-05-29 PROCEDURE — 94729 DIFFUSING CAPACITY: CPT

## 2024-05-29 PROCEDURE — 94060 EVALUATION OF WHEEZING: CPT

## 2024-05-29 PROCEDURE — 94726 PLETHYSMOGRAPHY LUNG VOLUMES: CPT

## 2024-05-29 PROCEDURE — 71046 X-RAY EXAM CHEST 2 VIEWS: CPT

## 2024-07-15 ENCOUNTER — HOSPITAL ENCOUNTER (OUTPATIENT)
Dept: HOSPITAL 22 - LAB.DROPOF | Age: 49
Discharge: HOME | End: 2024-07-15
Payer: COMMERCIAL

## 2024-07-15 DIAGNOSIS — R06.00: ICD-10-CM

## 2024-07-15 DIAGNOSIS — Z11.4: Primary | ICD-10-CM

## 2024-07-15 DIAGNOSIS — R06.02: ICD-10-CM

## 2024-07-15 DIAGNOSIS — Z13.21: ICD-10-CM

## 2024-07-15 DIAGNOSIS — Z01.419 ENCOUNTER FOR GYNECOLOGICAL EXAMINATION WITHOUT ABNORMAL FINDING: Primary | ICD-10-CM

## 2024-07-15 DIAGNOSIS — E55.9: ICD-10-CM

## 2024-07-15 DIAGNOSIS — Z11.59: ICD-10-CM

## 2024-07-15 DIAGNOSIS — Z20.828: ICD-10-CM

## 2024-07-15 LAB
ALBUMIN LEVEL: 3.9 G/DL (ref 3.5–5)
ALBUMIN/GLOB SERPL: 1.3 {RATIO} (ref 1.1–1.8)
ALP ISO SERPL-ACNC: 91 U/L (ref 38–126)
ALT SERPLBLD-CCNC: 23 U/L (ref 12–78)
ANION GAP SERPL CALC-SCNC: 12.1 MEQ/L (ref 5–15)
AST SERPL QL: 25 U/L (ref 14–36)
BILIRUBIN,TOTAL: 0.6 MG/DL (ref 0.2–1.3)
BUN SERPL-MCNC: 14 MG/DL (ref 7–17)
CALCIUM SPEC-MCNC: 9 MG/DL (ref 8.4–10.2)
CHLORIDE SPEC-SCNC: 110 MMOL/L (ref 98–107)
CHOLEST SPEC-SCNC: 162 MG/DL (ref 140–200)
CO2 SERPL-SCNC: 23 MMOL/L (ref 22–30)
CREAT BLD-SCNC: 0.6 MG/DL (ref 0.52–1.04)
ESTIMATED GLOMERULAR FILT RATE: 107 ML/MIN (ref 60–?)
GFR (AFRICAN AMERICAN): 129 ML/MIN (ref 60–?)
GLOBULIN SER CALC-MCNC: 2.9 G/DL (ref 1.3–3.2)
GLUCOSE: 78 MG/DL (ref 74–100)
HBA1C MFR BLD: 5.3 % (ref 4–6)
HCT VFR BLD CALC: 40.3 % (ref 37–47)
HDLC SERPL-MCNC: 57 MG/DL (ref 40–60)
HGB BLD-MCNC: 12.7 G/DL (ref 12.2–16.2)
MCHC RBC-ENTMCNC: 31.5 G/DL (ref 31.8–35.4)
MCV RBC: 82.9 FL (ref 81–99)
MEAN CORPUSCULAR HEMOGLOBIN: 26.2 PG (ref 27–31.2)
PLATELET # BLD: 388 K/MM3 (ref 142–424)
POTASSIUM: 4.1 MMOL/L (ref 3.5–5.1)
PROT SERPL-MCNC: 6.8 G/DL (ref 6.3–8.2)
RBC # BLD AUTO: 4.86 M/MM3 (ref 4.2–5.4)
SODIUM SPEC-SCNC: 141 MMOL/L (ref 136–145)
TRIGLYCERIDES: 61 MG/DL (ref 30–150)
WBC # BLD AUTO: 4.5 K/MM3 (ref 4.8–10.8)

## 2024-07-15 PROCEDURE — 80061 LIPID PANEL: CPT

## 2024-07-15 PROCEDURE — 83550 IRON BINDING TEST: CPT

## 2024-07-15 PROCEDURE — 87529 HSV DNA AMP PROBE: CPT

## 2024-07-15 PROCEDURE — 82728 ASSAY OF FERRITIN: CPT

## 2024-07-15 PROCEDURE — 80050 GENERAL HEALTH PANEL: CPT

## 2024-07-15 PROCEDURE — 83036 HEMOGLOBIN GLYCOSYLATED A1C: CPT

## 2024-07-15 PROCEDURE — 84443 ASSAY THYROID STIM HORMONE: CPT

## 2024-07-15 PROCEDURE — 85025 COMPLETE CBC W/AUTO DIFF WBC: CPT

## 2024-07-15 PROCEDURE — 82306 VITAMIN D 25 HYDROXY: CPT

## 2024-07-15 PROCEDURE — 83540 ASSAY OF IRON: CPT

## 2024-07-15 PROCEDURE — 80053 COMPREHEN METABOLIC PANEL: CPT

## 2024-07-16 LAB
25-OH VITAMIN D, TOTAL: 28.3 NG/ML (ref 30–100)
FERRITIN SERPL-MCNC: 12.1 NG/ML (ref 6.24–137)
HIV 1 + 2 AB PNL SERPLBLD IA.RAPID: NON REACTIVE
IRON SERPL QL: 74 UG/DL (ref 37–170)
TOTAL IRON BINDING CAPACITY: 398 UG/DL (ref 265–497)
TSH SERPL-ACNC: 0.98 UIU/ML (ref 0.47–4.68)

## 2024-07-17 ENCOUNTER — HOSPITAL ENCOUNTER (OUTPATIENT)
Dept: HOSPITAL 22 - RT | Age: 49
Discharge: HOME | End: 2024-07-17
Payer: COMMERCIAL

## 2024-07-17 DIAGNOSIS — Z86.718: ICD-10-CM

## 2024-07-17 DIAGNOSIS — M79.605: Primary | ICD-10-CM

## 2024-07-17 PROCEDURE — 93971 EXTREMITY STUDY: CPT

## 2024-07-25 ENCOUNTER — HOSPITAL ENCOUNTER (OUTPATIENT)
Dept: HOSPITAL 22 - RAD | Age: 49
Discharge: HOME | End: 2024-07-25
Payer: COMMERCIAL

## 2024-07-25 DIAGNOSIS — Z12.31: Primary | ICD-10-CM

## 2024-07-25 PROCEDURE — 77063 BREAST TOMOSYNTHESIS BI: CPT

## 2024-07-25 PROCEDURE — 77067 SCR MAMMO BI INCL CAD: CPT

## 2024-08-01 ENCOUNTER — HOSPITAL ENCOUNTER (OUTPATIENT)
Dept: HOSPITAL 22 - RAD | Age: 49
Discharge: HOME | End: 2024-08-01
Payer: COMMERCIAL

## 2024-08-01 DIAGNOSIS — N63.0: ICD-10-CM

## 2024-08-01 DIAGNOSIS — R92.8: Primary | ICD-10-CM

## 2024-08-01 PROCEDURE — 76641 ULTRASOUND BREAST COMPLETE: CPT

## 2024-08-01 PROCEDURE — 77065 DX MAMMO INCL CAD UNI: CPT

## 2024-08-01 PROCEDURE — G0279 TOMOSYNTHESIS, MAMMO: HCPCS

## 2024-08-01 PROCEDURE — 77061 BREAST TOMOSYNTHESIS UNI: CPT

## 2024-08-09 ENCOUNTER — HOSPITAL ENCOUNTER (OUTPATIENT)
Dept: HOSPITAL 22 - RT | Age: 49
Discharge: HOME | End: 2024-08-09
Payer: COMMERCIAL

## 2024-08-09 DIAGNOSIS — R07.89: Primary | ICD-10-CM

## 2024-08-09 DIAGNOSIS — I10: ICD-10-CM

## 2024-08-09 DIAGNOSIS — R06.00: ICD-10-CM

## 2024-08-09 DIAGNOSIS — Z86.718: ICD-10-CM

## 2024-08-09 PROCEDURE — 93306 TTE W/DOPPLER COMPLETE: CPT

## 2024-08-21 ENCOUNTER — OFFICE VISIT (OUTPATIENT)
Dept: OBSTETRICS AND GYNECOLOGY | Facility: CLINIC | Age: 49
End: 2024-08-21
Payer: COMMERCIAL

## 2024-08-21 VITALS
BODY MASS INDEX: 37.19 KG/M2 | HEIGHT: 61 IN | DIASTOLIC BLOOD PRESSURE: 80 MMHG | WEIGHT: 197 LBS | SYSTOLIC BLOOD PRESSURE: 98 MMHG

## 2024-08-21 DIAGNOSIS — Z01.419 PAP TEST, AS PART OF ROUTINE GYNECOLOGICAL EXAMINATION: Primary | ICD-10-CM

## 2024-08-21 DIAGNOSIS — Z01.419 WOMEN'S ANNUAL ROUTINE GYNECOLOGICAL EXAMINATION: ICD-10-CM

## 2024-08-21 RX ORDER — PANTOPRAZOLE SODIUM 40 MG/1
TABLET, DELAYED RELEASE ORAL
COMMUNITY
Start: 2024-08-18

## 2024-08-21 RX ORDER — FUROSEMIDE 20 MG/1
20 TABLET ORAL EVERY MORNING
COMMUNITY
Start: 2024-08-05

## 2024-08-21 RX ORDER — METOPROLOL SUCCINATE 25 MG/1
1 TABLET, EXTENDED RELEASE ORAL DAILY
COMMUNITY
Start: 2024-08-05

## 2024-08-21 RX ORDER — OMEPRAZOLE 20 MG/1
20 CAPSULE, DELAYED RELEASE ORAL EVERY MORNING
COMMUNITY
Start: 2024-05-30

## 2024-08-21 NOTE — PROGRESS NOTES
Gynecologic Annual Exam Note          GYN Annual Exam     Gynecologic Exam        Subjective     HPI  Ruba Junior is a 49 y.o. female, , who presents for annual well woman exam as a new patient. There were no changes to her medical or surgical history since her last visit..  2024  Her periods occur every 28-30, lasting 4-7 days.  The flow is moderate with clots. She reports dysmenorrhea is mild occurring first 1-2 days of flow. Marital Status: . She is sexually active. She has not had new partners.. STD testing recommendations have been explained to the patient and she declines STD testing.    The patient would like to discuss the following complaints today: Patient states she has some lumps at vaginal opening     Additional OB/GYN History   contraceptive methods: Tubal ligation  Desires to: do not start contraception  History of migraines: no    Last Pap : . Result: negative. HPV: unknown .   Last Completed Pap Smear       This patient has no relevant Health Maintenance data.          History of abnormal Pap smear: no  Family history of uterine, colon, breast, or ovarian cancer: yes - MA and MGM breast  Performs monthly Self-Breast Exam: yes  Last mammogram: 2024. Done at [facility]. There is not a copy in the chart.  States they found cyst and then did U/S  everything normal  Last Completed Mammogram       This patient has no relevant Health Maintenance data.            Colonoscopy: has had a colonoscopy not sure thinks she is due ago  Exercises Regularly: yes  Feelings of Anxiety or Depression: no  Tobacco Usage?: No       Current Outpatient Medications:     furosemide (LASIX) 20 MG tablet, Take 1 tablet by mouth Every Morning., Disp: , Rfl:     metoprolol succinate XL (TOPROL-XL) 25 MG 24 hr tablet, Take 1 tablet by mouth Daily., Disp: , Rfl:     omeprazole (priLOSEC) 20 MG capsule, Take 1 capsule by mouth Every Morning., Disp: , Rfl:     pantoprazole (PROTONIX) 40 MG EC tablet, ,  "Disp: , Rfl:      Patient denies the need for medication refills today.    OB History          3    Para        Term                AB   1    Living   2         SAB   1    IAB        Ectopic        Molar        Multiple        Live Births                    Past Medical History:   Diagnosis Date    Acid reflux     BP (high blood pressure)     History of blood clots         Past Surgical History:   Procedure Laterality Date    CHOLECYSTECTOMY      HERNIA REPAIR      TONSILLECTOMY      TUBAL ABDOMINAL LIGATION         Health Maintenance   Topic Date Due    MAMMOGRAM  Never done    BMI FOLLOWUP  Never done    COLORECTAL CANCER SCREENING  Never done    TDAP/TD VACCINES (2 - Tdap) 2014    COVID-19 Vaccine (2 - - season) 2023    HEPATITIS C SCREENING  Never done    ANNUAL PHYSICAL  Never done    PAP SMEAR  Never done    INFLUENZA VACCINE  2024    Annual Gynecologic Pelvic and Breast Exam  2025    Pneumococcal Vaccine 0-64  Aged Out       The additional following portions of the patient's history were reviewed and updated as appropriate: allergies, current medications, past family history, past medical history, past social history, past surgical history, and problem list.    Review of Systems      I have reviewed and agree with the HPI, ROS, and historical information as entered above. Kandy Goins MD            Objective   BP 98/80   Ht 154.9 cm (61\")   Wt 89.4 kg (197 lb)   LMP 2024   BMI 37.22 kg/m²     Physical Exam  Vitals and nursing note reviewed. Exam conducted with a chaperone present.   Constitutional:       Appearance: She is well-developed.   HENT:      Head: Normocephalic and atraumatic.   Eyes:      Pupils: Pupils are equal, round, and reactive to light.   Neck:      Thyroid: No thyroid mass or thyromegaly.   Pulmonary:      Effort: Pulmonary effort is normal. No retractions.   Chest:      Chest wall: No mass.   Breasts:     Right: Normal. No mass, nipple " discharge, skin change or tenderness.      Left: Normal. No mass, nipple discharge, skin change or tenderness.   Abdominal:      General: Bowel sounds are normal.      Palpations: Abdomen is soft. Abdomen is not rigid. There is no mass.      Tenderness: There is no abdominal tenderness. There is no guarding.      Hernia: No hernia is present. There is no hernia in the left inguinal area or right inguinal area.   Genitourinary:     Exam position: Lithotomy position.      Pubic Area: No rash.       Labia:         Right: No rash, tenderness or lesion.         Left: No rash, tenderness or lesion.       Urethra: No urethral pain or urethral swelling.      Vagina: Normal. No vaginal discharge or lesions.      Cervix: No cervical motion tenderness, discharge, lesion or cervical bleeding.      Uterus: Normal. Not enlarged, not fixed and not tender.       Adnexa:         Right: No mass, tenderness or fullness.          Left: No mass, tenderness or fullness.        Rectum: No external hemorrhoid.   Musculoskeletal:      Cervical back: Normal range of motion. No muscular tenderness.      Right lower leg: No edema.      Left lower leg: No edema.   Skin:     General: Skin is warm and dry.   Neurological:      Mental Status: She is alert and oriented to person, place, and time.      Motor: Motor function is intact.   Psychiatric:         Mood and Affect: Mood and affect normal.         Behavior: Behavior normal.     Normal vaginal rugae       Assessment and Plan    Problem List Items Addressed This Visit    None  Visit Diagnoses       Pap test, as part of routine gynecological examination    -  Primary    Relevant Orders    LIQUID-BASED PAP SMEAR WITH HPV GENOTYPING REGARDLESS OF INTERPRETATION ([facility])    Women's annual routine gynecological examination                GYN annual well woman exam.   Pap guidelines reviewed.  Reviewed monthly self breast exams.  Instructed to call with lumps, pain, or breast discharge.     Reviewed exercise as a preventative health measures.   Symptoms of menopausal transition reviewed with patient.   RTC in 1 year or PRN with problems.  Return in about 1 year (around 8/21/2025) for Annual physical.     Kandy Goins MD  08/21/2024

## 2024-08-23 ENCOUNTER — PATIENT ROUNDING (BHMG ONLY) (OUTPATIENT)
Dept: OBSTETRICS AND GYNECOLOGY | Facility: CLINIC | Age: 49
End: 2024-08-23
Payer: COMMERCIAL

## 2024-08-23 NOTE — PROGRESS NOTES
August 23, 2024    Hello, may I speak with Ruba Junior?    My name is Bernarda      I am  with [facility]  [facility]  [address]  [facility] [address] [phone].    Before we get started may I verify your date of birth? 1975    I am calling to officially welcome you to our practice and ask about your recent visit. Is this a good time to talk? yes    Tell me about your visit with us. What things went well?  Everything went well.        We're always looking for ways to make our patients' experiences even better. Do you have recommendations on ways we may improve?  no    Overall were you satisfied with your first visit to our practice? yes       I appreciate you taking the time to speak with me today. Is there anything else I can do for you? no      Thank you, and have a great day.

## 2024-08-28 LAB — REF LAB TEST METHOD: NORMAL

## 2024-09-04 ENCOUNTER — HOSPITAL ENCOUNTER (OUTPATIENT)
Dept: HOSPITAL 22 - LAB | Age: 49
Discharge: HOME | End: 2024-09-04
Payer: COMMERCIAL

## 2024-09-04 DIAGNOSIS — R07.89: Primary | ICD-10-CM

## 2024-09-04 DIAGNOSIS — R06.09: ICD-10-CM

## 2024-09-04 DIAGNOSIS — R00.0: ICD-10-CM

## 2024-09-04 DIAGNOSIS — R93.1: ICD-10-CM

## 2024-09-04 LAB
BUN SERPL-MCNC: 11 MG/DL (ref 7–17)
CREAT BLD-SCNC: 0.6 MG/DL (ref 0.52–1.04)
ESTIMATED GLOMERULAR FILT RATE: 107 ML/MIN (ref 60–?)
GFR (AFRICAN AMERICAN): 129 ML/MIN (ref 60–?)

## 2024-09-04 PROCEDURE — 36415 COLL VENOUS BLD VENIPUNCTURE: CPT

## 2024-09-04 PROCEDURE — 82565 ASSAY OF CREATININE: CPT

## 2024-09-04 PROCEDURE — 84520 ASSAY OF UREA NITROGEN: CPT

## 2024-10-03 ENCOUNTER — HOSPITAL ENCOUNTER (OUTPATIENT)
Dept: HOSPITAL 22 - RAD | Age: 49
Discharge: HOME | End: 2024-10-03
Payer: COMMERCIAL

## 2024-10-03 DIAGNOSIS — M25.511: Primary | ICD-10-CM

## 2024-10-03 DIAGNOSIS — M54.2: ICD-10-CM

## 2024-10-03 PROCEDURE — 72040 X-RAY EXAM NECK SPINE 2-3 VW: CPT

## 2024-10-03 PROCEDURE — 73030 X-RAY EXAM OF SHOULDER: CPT

## 2024-10-15 ENCOUNTER — HOSPITAL ENCOUNTER (OUTPATIENT)
Dept: HOSPITAL 22 - RT | Age: 49
Discharge: HOME | End: 2024-10-15
Payer: COMMERCIAL

## 2024-10-15 DIAGNOSIS — R60.0: Primary | ICD-10-CM

## 2024-10-15 PROCEDURE — 93971 EXTREMITY STUDY: CPT

## 2024-12-03 ENCOUNTER — HOSPITAL ENCOUNTER (OUTPATIENT)
Dept: HOSPITAL 22 - LAB.DROPOF | Age: 49
Discharge: HOME | End: 2024-12-03
Payer: COMMERCIAL

## 2024-12-03 DIAGNOSIS — R09.81: Primary | ICD-10-CM

## 2024-12-03 PROCEDURE — 87636 SARSCOV2 & INF A&B AMP PRB: CPT

## 2025-04-07 ENCOUNTER — HOSPITAL ENCOUNTER (OUTPATIENT)
Dept: HOSPITAL 22 - RAD | Age: 50
Discharge: HOME | End: 2025-04-07
Payer: COMMERCIAL

## 2025-04-07 DIAGNOSIS — M79.671: Primary | ICD-10-CM

## 2025-04-07 PROCEDURE — 73630 X-RAY EXAM OF FOOT: CPT

## 2025-07-23 ENCOUNTER — OFFICE VISIT (OUTPATIENT)
Dept: OBSTETRICS AND GYNECOLOGY | Facility: CLINIC | Age: 50
End: 2025-07-23
Payer: COMMERCIAL

## 2025-07-23 VITALS
DIASTOLIC BLOOD PRESSURE: 88 MMHG | WEIGHT: 212 LBS | SYSTOLIC BLOOD PRESSURE: 120 MMHG | HEIGHT: 61 IN | BODY MASS INDEX: 40.02 KG/M2

## 2025-07-23 DIAGNOSIS — Z76.89 ENCOUNTER FOR BIOPSY: ICD-10-CM

## 2025-07-23 DIAGNOSIS — N93.9 ABNORMAL UTERINE BLEEDING (AUB): Primary | ICD-10-CM

## 2025-07-23 PROBLEM — Z86.718 HISTORY OF DVT (DEEP VEIN THROMBOSIS): Status: ACTIVE | Noted: 2025-07-23

## 2025-07-23 PROBLEM — Z86.718 HISTORY OF DVT (DEEP VEIN THROMBOSIS): Status: RESOLVED | Noted: 2025-07-23 | Resolved: 2025-07-23

## 2025-07-23 LAB
B-HCG UR QL: NEGATIVE
EXPIRATION DATE: NORMAL
INTERNAL NEGATIVE CONTROL: NORMAL
INTERNAL POSITIVE CONTROL: NORMAL
Lab: NORMAL

## 2025-07-23 RX ORDER — MELOXICAM 7.5 MG/1
TABLET ORAL
COMMUNITY
Start: 2025-05-26

## 2025-07-23 RX ORDER — MEDROXYPROGESTERONE ACETATE 10 MG
10 TABLET ORAL DAILY
Qty: 30 TABLET | Refills: 1 | Status: SHIPPED | OUTPATIENT
Start: 2025-07-23 | End: 2025-08-22

## 2025-07-23 NOTE — PROGRESS NOTES
Chief Complaint   Patient presents with    Abnormal Uterine Bleeding         Subjective   HPI  Ruba Junior is a 50 y.o. female, , Patient's last menstrual period was 2025.. She presents for initial evaluation of AUB.  The menstrual problem began 3 months ago. Pt reports she has been bleeding daily for approx 3 months. States the flow is heavy most days, needing to change her super plus tampon every 2 hours. Also reports passing several large golf ball sized clots. Also reports intermittent cramping. Prior to today's visit, the patient has been evaluated:  No. States she has been told she may have uterine prolapse.     US done today: Yes.  Findings showed possible adenomyosis, 5cm fundal fibroid seen. 2.5cm left ovarian cyst. .  I have personally evaluated the U/S and agree with the findings. Kandy Goins MD     Thromboembolic Disease: none, she has had a history of superficial thrombophlebitis, but not DVT  History of hypertension: yes  History of migraines: yes with aura  Tobacco Usage?: No     Pt would also like an order for her annual mammogram while she is here.     Additional OB/GYN History   Last Pap :   Last Completed Pap Smear            Upcoming       PAP SMEAR (Every 3 Years) Next due on 2024  LIQUID-BASED PAP SMEAR WITH HPV GENOTYPING REGARDLESS OF INTERPRETATION ([facility])                              Current Outpatient Medications:     meloxicam (MOBIC) 7.5 MG tablet, TAKE 1 TABLET BY MOUTH DAILY FOR PAIN FOR 30 DAYS, Disp: , Rfl:     Metoprolol Succinate 50 MG capsule extended-release 24 hour sprinkle, Take 1 capsule by mouth Daily., Disp: , Rfl:     omeprazole (priLOSEC) 20 MG capsule, Take 1 capsule by mouth Every Morning., Disp: , Rfl:     pantoprazole (PROTONIX) 40 MG EC tablet, , Disp: , Rfl:     furosemide (LASIX) 20 MG tablet, Take 1 tablet by mouth Every Morning., Disp: , Rfl:     medroxyPROGESTERone (Provera) 10 MG tablet, Take 1 tablet by  "mouth Daily for 30 days., Disp: 30 tablet, Rfl: 1     Past Medical History:   Diagnosis Date    Acid reflux     BP (high blood pressure)     History of blood clots         Past Surgical History:   Procedure Laterality Date    CHOLECYSTECTOMY      HERNIA REPAIR      TONSILLECTOMY      TUBAL ABDOMINAL LIGATION           The additional following portions of the patient's history were reviewed and updated as appropriate: allergies, current medications, past family history, past medical history, past social history, past surgical history, and problem list.    Review of Systems   Constitutional: Negative.    HENT: Negative.     Eyes: Negative.    Respiratory: Negative.     Cardiovascular: Negative.    Gastrointestinal: Negative.    Endocrine: Negative.    Genitourinary:  Positive for menstrual problem and pelvic pain.   Musculoskeletal: Negative.    Skin: Negative.    Allergic/Immunologic: Negative.    Neurological: Negative.    Hematological: Negative.    Psychiatric/Behavioral: Negative.         I have reviewed and agree with the HPI, ROS, and historical information as entered above. Kandy Goins MD      Objective   /88   Ht 154.9 cm (61\")   Wt 96.2 kg (212 lb)   LMP 04/23/2025   BMI 40.06 kg/m²     Physical Exam  Vitals and nursing note reviewed. Exam conducted with a chaperone present.   Constitutional:       Appearance: Normal appearance. She is well-developed.   HENT:      Head: Normocephalic and atraumatic.      Nose: Nose normal. No congestion or rhinorrhea.   Eyes:      General: No scleral icterus.     Pupils: Pupils are equal, round, and reactive to light.   Pulmonary:      Effort: Pulmonary effort is normal.      Breath sounds: Normal breath sounds.   Abdominal:      General: There is no distension.      Palpations: Abdomen is soft.      Tenderness: There is no abdominal tenderness. There is no guarding or rebound.   Genitourinary:     General: Normal vulva.      Exam position: Lithotomy position.    "   Labia:         Right: No rash or lesion.         Left: No rash or lesion.       Urethra: No prolapse.      Vagina: Normal.      Cervix: No cervical motion tenderness, discharge, lesion or cervical bleeding.      Uterus: Normal. Not tender.       Adnexa: Right adnexa normal and left adnexa normal.        Right: No tenderness.          Left: No tenderness.        Rectum: Normal.   Musculoskeletal:         General: No swelling. Normal range of motion.      Cervical back: Normal range of motion and neck supple.   Skin:     General: Skin is warm and dry.   Neurological:      General: No focal deficit present.      Mental Status: She is alert and oriented to person, place, and time.   Psychiatric:         Mood and Affect: Mood normal.         Behavior: Behavior normal. Behavior is cooperative.         Assessment & Plan     Assessment     Problem List Items Addressed This Visit    None  Visit Diagnoses         Abnormal uterine bleeding (AUB)    -  Primary    Relevant Orders    Mammo Screening Digital Tomosynthesis Bilateral With CAD    CBC & Differential    Comprehensive Metabolic Panel    Hemoglobin A1c    TSH    Estradiol    Follicle Stimulating Hormone      Encounter for biopsy        Relevant Orders    POC Pregnancy, Urine (Completed)    TISSUE EXAM, [facility] ([facility])              Plan     Aub. US today 5cm uterine fibroid. Labs today and endometrial biopsy done. Will start provera daily to help with current bleeding episode. Also encouraged iron if anemic on labs. Discussed options for management of her bleeding. She is interested in mirena iud. Info given. Will fu for insertion.   I spent 30 minutes caring for Ruba on this date of service. This time includes time spent by me in the following activities:preparing for the visit, reviewing tests, obtaining and/or reviewing a separately obtained history, performing a medically appropriate examination and/or evaluation , counseling and educating the  patient/family/caregiver, ordering medications, tests, or procedures, and documenting information in the medical record                Endometrial Biopsy      Indications: Ruba Junior is a 50 y.o. , who presents today for evaluation of Abnormal Uterine Bleeding. As part of the evaluation, an endometrial biopsy was recommended.  The patient was noted to have Abnormal Uterine Bleeding.  Her LMP is Patient's last menstrual period was 2025. . After being presented with the risk, benefits, and specific detail of the procedure, the patient wished to proceed.  Written consent was obtained from patient.   Urine pregnancy test was Negative. Patient does not have an allergy to betadine or shellfish.     Procedure Details     Time out: immediate members of the procedure team and patient agree to the following: correct patient, correct site, correct procedure to be performed. Kandy Goins MD      The patient was placed on the table in the dorsal lithotomy position.  She was draped in the appropriate manner.  A speculum was placed in the vagina.  The cervix was visualized and prepped with Betadine.  A tenaculum was placed on the anterior lip of the cervix for traction.  A small plastic 5 mm Pipelle syringe curette was inserted into the cervical canal.  The uterus was sounded to 9 cm's.  A vigorous four quadrant biopsy was performed, removing a medium amount of tissue.  The tissue was placed in Formalin and sent to Pathology.  The patient tolerated the procedure well and she reported mild cramping.  The tenaculum was removed from the cervix and the speculum was removed.  The patient was observed for 10 minutes.           Complications: none.     Procedures    Review of Systems   Constitutional: Negative.    HENT: Negative.     Eyes: Negative.    Respiratory: Negative.     Cardiovascular: Negative.    Gastrointestinal: Negative.    Endocrine: Negative.    Genitourinary:  Positive for menstrual problem and pelvic  "pain.   Musculoskeletal: Negative.    Skin: Negative.    Allergic/Immunologic: Negative.    Neurological: Negative.    Hematological: Negative.    Psychiatric/Behavioral: Negative.       /88   Ht 154.9 cm (61\")   Wt 96.2 kg (212 lb)   LMP 04/23/2025   BMI 40.06 kg/m²       Plan:  Orders Placed This Encounter   Procedures    Mammo Screening Digital Tomosynthesis Bilateral With CAD     Standing Status:   Future     Expected Date:   7/23/2026     Expiration Date:   7/23/2026     Reason for Exam::   screen     Release to patient:   Routine Release [[id number]]    Comprehensive Metabolic Panel     Release to patient:   Routine Release [[id number]]    Hemoglobin A1c     Release to patient:   Routine Release [[id number]]    TSH     Release to patient:   Routine Release [[id number]]    Estradiol     Release to patient:   Routine Release [[id number]]    Follicle Stimulating Hormone     Release to patient:   Routine Release [[id number]]    POC Pregnancy, Urine     Release to patient:   Routine Release [[id number]]    CBC & Differential     Manual Differential:   No     Release to patient:   Routine Release [[id number]]       Problem List Items Addressed This Visit    None  Visit Diagnoses         Abnormal uterine bleeding (AUB)    -  Primary    Relevant Orders    Mammo Screening Digital Tomosynthesis Bilateral With CAD    CBC & Differential    Comprehensive Metabolic Panel    Hemoglobin A1c    TSH    Estradiol    Follicle Stimulating Hormone      Encounter for biopsy        Relevant Orders    POC Pregnancy, Urine (Completed)    TISSUE EXAM, [facility] ([facility])                Instructions  Call the office in 5 business days for biopsy results.  Patient instructed to call the office if develops a fever of 100.4 or greater, vaginal bleeding heavier than a period, foul vaginal discharge or pain.     Kandy Goins MD  07/23/2025   "

## 2025-07-24 LAB
ALBUMIN SERPL-MCNC: 3.9 G/DL (ref 3.9–4.9)
ALP SERPL-CCNC: 91 IU/L (ref 44–121)
ALT SERPL-CCNC: 20 IU/L (ref 0–32)
AST SERPL-CCNC: 16 IU/L (ref 0–40)
BASOPHILS # BLD AUTO: 0 X10E3/UL (ref 0–0.2)
BASOPHILS NFR BLD AUTO: 0 %
BILIRUB SERPL-MCNC: <0.2 MG/DL (ref 0–1.2)
BUN SERPL-MCNC: 12 MG/DL (ref 6–24)
BUN/CREAT SERPL: 17 (ref 9–23)
CALCIUM SERPL-MCNC: 8.7 MG/DL (ref 8.7–10.2)
CHLORIDE SERPL-SCNC: 104 MMOL/L (ref 96–106)
CO2 SERPL-SCNC: 20 MMOL/L (ref 20–29)
CREAT SERPL-MCNC: 0.71 MG/DL (ref 0.57–1)
EGFRCR SERPLBLD CKD-EPI 2021: 104 ML/MIN/1.73
EOSINOPHIL # BLD AUTO: 0.2 X10E3/UL (ref 0–0.4)
EOSINOPHIL NFR BLD AUTO: 3 %
ERYTHROCYTE [DISTWIDTH] IN BLOOD BY AUTOMATED COUNT: 14.1 % (ref 11.7–15.4)
ESTRADIOL SERPL-MCNC: 73.2 PG/ML
FSH SERPL-ACNC: 6.8 MIU/ML
GLOBULIN SER CALC-MCNC: 2.5 G/DL (ref 1.5–4.5)
GLUCOSE SERPL-MCNC: 90 MG/DL (ref 70–99)
HBA1C MFR BLD: 5.6 % (ref 4.8–5.6)
HCT VFR BLD AUTO: 36.2 % (ref 34–46.6)
HGB BLD-MCNC: 11.2 G/DL (ref 11.1–15.9)
IMM GRANULOCYTES # BLD AUTO: 0 X10E3/UL (ref 0–0.1)
IMM GRANULOCYTES NFR BLD AUTO: 1 %
LYMPHOCYTES # BLD AUTO: 2 X10E3/UL (ref 0.7–3.1)
LYMPHOCYTES NFR BLD AUTO: 29 %
MCH RBC QN AUTO: 25.9 PG (ref 26.6–33)
MCHC RBC AUTO-ENTMCNC: 30.9 G/DL (ref 31.5–35.7)
MCV RBC AUTO: 84 FL (ref 79–97)
MONOCYTES # BLD AUTO: 0.6 X10E3/UL (ref 0.1–0.9)
MONOCYTES NFR BLD AUTO: 9 %
NEUTROPHILS # BLD AUTO: 4.1 X10E3/UL (ref 1.4–7)
NEUTROPHILS NFR BLD AUTO: 58 %
PLATELET # BLD AUTO: 338 X10E3/UL (ref 150–450)
POTASSIUM SERPL-SCNC: 4 MMOL/L (ref 3.5–5.2)
PROT SERPL-MCNC: 6.4 G/DL (ref 6–8.5)
RBC # BLD AUTO: 4.33 X10E6/UL (ref 3.77–5.28)
SODIUM SERPL-SCNC: 140 MMOL/L (ref 134–144)
TSH SERPL DL<=0.005 MIU/L-ACNC: 1.22 UIU/ML (ref 0.45–4.5)
WBC # BLD AUTO: 7 X10E3/UL (ref 3.4–10.8)

## 2025-07-25 LAB — REF LAB TEST METHOD: NORMAL

## 2025-08-20 ENCOUNTER — OFFICE VISIT (OUTPATIENT)
Dept: OBSTETRICS AND GYNECOLOGY | Facility: CLINIC | Age: 50
End: 2025-08-20
Payer: COMMERCIAL

## 2025-08-20 VITALS
BODY MASS INDEX: 39.65 KG/M2 | WEIGHT: 210 LBS | DIASTOLIC BLOOD PRESSURE: 88 MMHG | HEIGHT: 61 IN | SYSTOLIC BLOOD PRESSURE: 132 MMHG

## 2025-08-20 DIAGNOSIS — N93.9 ABNORMAL UTERINE BLEEDING (AUB): Primary | ICD-10-CM

## 2025-08-20 DIAGNOSIS — Z30.430 ENCOUNTER FOR INSERTION OF INTRAUTERINE CONTRACEPTIVE DEVICE (IUD): ICD-10-CM

## 2025-08-20 DIAGNOSIS — Z01.419 WOMEN'S ANNUAL ROUTINE GYNECOLOGICAL EXAMINATION: ICD-10-CM

## 2025-08-25 LAB — REF LAB TEST METHOD: NORMAL
